# Patient Record
Sex: MALE | Race: WHITE | NOT HISPANIC OR LATINO | Employment: FULL TIME | URBAN - METROPOLITAN AREA
[De-identification: names, ages, dates, MRNs, and addresses within clinical notes are randomized per-mention and may not be internally consistent; named-entity substitution may affect disease eponyms.]

---

## 2018-02-21 ENCOUNTER — CONVERSION ENCOUNTER (OUTPATIENT)
Dept: FAMILY MEDICINE CLINIC | Facility: CLINIC | Age: 33
End: 2018-02-21

## 2018-02-21 ENCOUNTER — OFFICE VISIT CONVERTED (OUTPATIENT)
Dept: FAMILY MEDICINE CLINIC | Facility: CLINIC | Age: 33
End: 2018-02-21
Attending: NURSE PRACTITIONER

## 2019-11-19 ENCOUNTER — HOSPITAL ENCOUNTER (OUTPATIENT)
Dept: URGENT CARE | Facility: CLINIC | Age: 34
Discharge: HOME | End: 2019-11-19
Attending: FAMILY MEDICINE

## 2019-12-26 ENCOUNTER — HOSPITAL ENCOUNTER (OUTPATIENT)
Dept: URGENT CARE | Facility: CLINIC | Age: 34
Discharge: HOME | End: 2019-12-26

## 2020-01-07 ENCOUNTER — OFFICE VISIT CONVERTED (OUTPATIENT)
Dept: ORTHOPEDIC SURGERY | Facility: CLINIC | Age: 35
End: 2020-01-07
Attending: PHYSICIAN ASSISTANT

## 2020-01-23 ENCOUNTER — OFFICE VISIT CONVERTED (OUTPATIENT)
Dept: ORTHOPEDIC SURGERY | Facility: CLINIC | Age: 35
End: 2020-01-23
Attending: PHYSICIAN ASSISTANT

## 2020-01-23 ENCOUNTER — CONVERSION ENCOUNTER (OUTPATIENT)
Dept: ORTHOPEDIC SURGERY | Facility: CLINIC | Age: 35
End: 2020-01-23

## 2020-10-05 ENCOUNTER — OFFICE VISIT CONVERTED (OUTPATIENT)
Dept: CARDIOLOGY | Facility: CLINIC | Age: 35
End: 2020-10-05
Attending: INTERNAL MEDICINE

## 2021-04-14 ENCOUNTER — HOSPITAL ENCOUNTER (OUTPATIENT)
Dept: URGENT CARE | Facility: CLINIC | Age: 36
Discharge: HOME | End: 2021-04-14
Attending: EMERGENCY MEDICINE

## 2021-05-13 NOTE — PROGRESS NOTES
Progress Note      Patient Name: Deo Pacheco   Patient ID: [id number]   Sex: Male   YOB: 1985    Primary Care Provider: Tara OCONNOR    Visit Date: October 5, 2020    Provider: Quique Bullock MD   Location: [facility]   Location Address: [address] [place] [address]  [id number]          History Of Present Illness  REFERRING CARE PROVIDER: Feliciano Astudillo MD   I saw Deo Pacheco back in the office today for a follow-up evaluation and management of coronary artery disease with previous LAD PCI, recent hospitalization for angina, hypertension and smoking. Mr. Pacheco is doing relatively well. He denies chest pain or excessive shortness of breath. He is compliant with his medications. He was hospitalized for anginal chest pain in August. I saw him in consultation at that time. He underwent cardiac catheterization, which showed a patent LAD stent, borderline RCA stenosis, which was negative by FFR assessment. He had diffuse disease in two branch arteries off of his circumflex, which were too small for PCI, and medical therapy was recommended. He is trying to cut down on his smoking using nicotine patches. He does not have a quit date yet. He is otherwise compliant with his medications.   PAST MEDICAL HISTORY: Coronary artery disease; obesity; smoking. PAST SURGICAL HISTORY: Elbow surgery   PSYCHOSOCIAL HISTORY: He rarely drinks alcohol. He smokes 1/2 pack of cigarettes per day.   CURRENT MEDICATIONS: include Lipitor 40 mg daily; Losartan 25 mg daily; Metoprolol 50 mg daily; Clonidine 0.1 mg b.i.d.; ASA 81 mg daily. The dosage and frequency of the medications were reviewed with the patient.       Review of Systems  · Cardiovascular  o Denies  o : palpitations (fast, fluttering, or skipping beats), swelling (feet, ankles, hands), shortness of breath while walking or lying flat, chest pain or angina pectoris   · Respiratory  o Denies  o :  "chronic or frequent cough, asthma or wheezing      Vitals  Date Time BP Position Site L\R Cuff Size HR RR TEMP (F) WT  HT  BMI kg/m2 BSA m2 O2 Sat FR L/min FiO2 HC       10/05/2020 12:41 /90 Sitting    78 - R   242lbs 16oz 5'  7\" 38.06 2.28             Physical Examination  · Respiratory  o Auscultation of Lungs  o : Clear to auscultation bilaterally. No crackles or rhonchi.  · Cardiovascular  o Heart  o : S1, S2 is normally heard. No S3. No murmur, rubs, or gallops.  · Gastrointestinal  o Abdominal Examination  o : Soft, nontender, nondistended. No free fluid. Bowel sounds heard in all four quadrants.  · Extremities  o Extremities  o : Warm and well perfused. No pitting pedal edema. Distal pulses present.     I reviewed his hospital records and cardiac catheterization with him today.           Assessment     1.  Coronary artery disease - Recent cardiac catheterization showed borderline FFR negative RCA stenosis with       patent LAD stent and small-vessel-branch disease of the circumflex.  The patient is clinically stable.  2.  Hypertension:  Mildly uncontrolled.  3.  Smoking.       Plan     1.  Start low-dose Brilinta 60 mg b.i.d. along with his low-dose aspirin.  2.  I counseled him again on smoking cessation.  He is going to try to redouble his efforts to quit but has not yet       picked a quit date.  3.  Continue anti-hypertensive regimen and statin therapy.  4.  Cut down dietary sodium.  5.  Attempt slow weight loss.  6.  Follow up in 6 months in the office, unless problems arise in the meantime.  He is agreeable with this plan.    Please let me know if you have any questions regarding his case.    Sincerely,        MARITZA banegas/logan           This note was transcribed by Latosha Robin.  dmd/cbd  The above service was transcribed by Latosha Robin, and I attest to the accuracy of the note.  CBD.             Electronically Signed by: Latosha Robin-, -Author on October 7, 2020 09:03:37 " AM  Electronically Co-signed by: Quique Bullock MD -Reviewer on October 7, 2020 01:35:19 PM

## 2021-05-14 VITALS
SYSTOLIC BLOOD PRESSURE: 141 MMHG | HEIGHT: 67 IN | DIASTOLIC BLOOD PRESSURE: 90 MMHG | HEART RATE: 78 BPM | WEIGHT: 243 LBS | BODY MASS INDEX: 38.14 KG/M2

## 2021-05-15 VITALS — OXYGEN SATURATION: 99 % | BODY MASS INDEX: 39.24 KG/M2 | WEIGHT: 250 LBS | HEART RATE: 68 BPM | HEIGHT: 67 IN

## 2021-05-16 VITALS
HEIGHT: 67 IN | OXYGEN SATURATION: 97 % | BODY MASS INDEX: 39.55 KG/M2 | RESPIRATION RATE: 16 BRPM | TEMPERATURE: 98.4 F | HEART RATE: 89 BPM | WEIGHT: 252 LBS | DIASTOLIC BLOOD PRESSURE: 78 MMHG | SYSTOLIC BLOOD PRESSURE: 126 MMHG

## 2022-04-19 ENCOUNTER — APPOINTMENT (OUTPATIENT)
Dept: GENERAL RADIOLOGY | Facility: HOSPITAL | Age: 37
End: 2022-04-19

## 2022-04-19 ENCOUNTER — HOSPITAL ENCOUNTER (OUTPATIENT)
Facility: HOSPITAL | Age: 37
Setting detail: OBSERVATION
Discharge: HOME | End: 2022-04-20
Attending: EMERGENCY MEDICINE | Admitting: INTERNAL MEDICINE

## 2022-04-19 ENCOUNTER — APPOINTMENT (OUTPATIENT)
Dept: CARDIOLOGY | Facility: HOSPITAL | Age: 37
End: 2022-04-19

## 2022-04-19 DIAGNOSIS — I24.9 ACUTE CORONARY SYNDROME: Primary | ICD-10-CM

## 2022-04-19 PROBLEM — R07.9 CHEST PAIN: Status: ACTIVE | Noted: 2022-04-19

## 2022-04-19 LAB
ALBUMIN SERPL-MCNC: 4.5 G/DL (ref 3.5–5.2)
ALBUMIN/GLOB SERPL: 1.7 G/DL
ALP SERPL-CCNC: 130 U/L (ref 39–117)
ALT SERPL W P-5'-P-CCNC: 31 U/L (ref 1–41)
ANION GAP SERPL CALCULATED.3IONS-SCNC: 11.9 MMOL/L (ref 5–15)
AST SERPL-CCNC: 16 U/L (ref 1–40)
BASOPHILS # BLD AUTO: 0.06 10*3/MM3 (ref 0–0.2)
BASOPHILS NFR BLD AUTO: 0.6 % (ref 0–1.5)
BILIRUB SERPL-MCNC: 0.3 MG/DL (ref 0–1.2)
BUN SERPL-MCNC: 10 MG/DL (ref 6–20)
BUN/CREAT SERPL: 11.4 (ref 7–25)
CALCIUM SPEC-SCNC: 8.8 MG/DL (ref 8.6–10.5)
CHLORIDE SERPL-SCNC: 104 MMOL/L (ref 98–107)
CK MB SERPL-CCNC: 1.37 NG/ML
CK SERPL-CCNC: 108 U/L (ref 20–200)
CO2 SERPL-SCNC: 24.1 MMOL/L (ref 22–29)
CREAT SERPL-MCNC: 0.88 MG/DL (ref 0.76–1.27)
DEPRECATED RDW RBC AUTO: 41.1 FL (ref 37–54)
EGFRCR SERPLBLD CKD-EPI 2021: 115.7 ML/MIN/1.73
EOSINOPHIL # BLD AUTO: 0.19 10*3/MM3 (ref 0–0.4)
EOSINOPHIL NFR BLD AUTO: 2 % (ref 0.3–6.2)
ERYTHROCYTE [DISTWIDTH] IN BLOOD BY AUTOMATED COUNT: 13.2 % (ref 12.3–15.4)
GLOBULIN UR ELPH-MCNC: 2.6 GM/DL
GLUCOSE SERPL-MCNC: 130 MG/DL (ref 65–99)
HCT VFR BLD AUTO: 43.2 % (ref 37.5–51)
HGB BLD-MCNC: 15.1 G/DL (ref 13–17.7)
HOLD SPECIMEN: NORMAL
IMM GRANULOCYTES # BLD AUTO: 0.03 10*3/MM3 (ref 0–0.05)
IMM GRANULOCYTES NFR BLD AUTO: 0.3 % (ref 0–0.5)
LIPASE SERPL-CCNC: 35 U/L (ref 13–60)
LYMPHOCYTES # BLD AUTO: 2.26 10*3/MM3 (ref 0.7–3.1)
LYMPHOCYTES NFR BLD AUTO: 23.9 % (ref 19.6–45.3)
MAGNESIUM SERPL-MCNC: 2.1 MG/DL (ref 1.6–2.6)
MCH RBC QN AUTO: 29.8 PG (ref 26.6–33)
MCHC RBC AUTO-ENTMCNC: 35 G/DL (ref 31.5–35.7)
MCV RBC AUTO: 85.2 FL (ref 79–97)
MONOCYTES # BLD AUTO: 0.63 10*3/MM3 (ref 0.1–0.9)
MONOCYTES NFR BLD AUTO: 6.7 % (ref 5–12)
NEUTROPHILS NFR BLD AUTO: 6.27 10*3/MM3 (ref 1.7–7)
NEUTROPHILS NFR BLD AUTO: 66.5 % (ref 42.7–76)
NRBC BLD AUTO-RTO: 0 /100 WBC (ref 0–0.2)
NT-PROBNP SERPL-MCNC: 28.7 PG/ML (ref 0–450)
PLATELET # BLD AUTO: 275 10*3/MM3 (ref 140–450)
PMV BLD AUTO: 9.5 FL (ref 6–12)
POTASSIUM SERPL-SCNC: 3.4 MMOL/L (ref 3.5–5.2)
PROT SERPL-MCNC: 7.1 G/DL (ref 6–8.5)
QT INTERVAL: 403 MS
RBC # BLD AUTO: 5.07 10*6/MM3 (ref 4.14–5.8)
SODIUM SERPL-SCNC: 140 MMOL/L (ref 136–145)
TROPONIN I SERPL-MCNC: 0.01 NG/ML (ref 0–0.6)
TROPONIN I SERPL-MCNC: 0.03 NG/ML (ref 0–0.6)
TROPONIN T SERPL-MCNC: <0.01 NG/ML (ref 0–0.03)
WBC NRBC COR # BLD: 9.44 10*3/MM3 (ref 3.4–10.8)
WHOLE BLOOD HOLD SPECIMEN: NORMAL
WHOLE BLOOD HOLD SPECIMEN: NORMAL

## 2022-04-19 PROCEDURE — 93306 TTE W/DOPPLER COMPLETE: CPT | Performed by: INTERNAL MEDICINE

## 2022-04-19 PROCEDURE — 84484 ASSAY OF TROPONIN QUANT: CPT | Performed by: INTERNAL MEDICINE

## 2022-04-19 PROCEDURE — 83880 ASSAY OF NATRIURETIC PEPTIDE: CPT | Performed by: EMERGENCY MEDICINE

## 2022-04-19 PROCEDURE — 93005 ELECTROCARDIOGRAM TRACING: CPT | Performed by: EMERGENCY MEDICINE

## 2022-04-19 PROCEDURE — 83690 ASSAY OF LIPASE: CPT | Performed by: EMERGENCY MEDICINE

## 2022-04-19 PROCEDURE — 83735 ASSAY OF MAGNESIUM: CPT | Performed by: EMERGENCY MEDICINE

## 2022-04-19 PROCEDURE — 84484 ASSAY OF TROPONIN QUANT: CPT

## 2022-04-19 PROCEDURE — 96372 THER/PROPH/DIAG INJ SC/IM: CPT

## 2022-04-19 PROCEDURE — 82550 ASSAY OF CK (CPK): CPT | Performed by: EMERGENCY MEDICINE

## 2022-04-19 PROCEDURE — G0378 HOSPITAL OBSERVATION PER HR: HCPCS

## 2022-04-19 PROCEDURE — 80053 COMPREHEN METABOLIC PANEL: CPT | Performed by: EMERGENCY MEDICINE

## 2022-04-19 PROCEDURE — 85025 COMPLETE CBC W/AUTO DIFF WBC: CPT | Performed by: EMERGENCY MEDICINE

## 2022-04-19 PROCEDURE — 82553 CREATINE MB FRACTION: CPT | Performed by: EMERGENCY MEDICINE

## 2022-04-19 PROCEDURE — 94799 UNLISTED PULMONARY SVC/PX: CPT

## 2022-04-19 PROCEDURE — 93005 ELECTROCARDIOGRAM TRACING: CPT

## 2022-04-19 PROCEDURE — 36415 COLL VENOUS BLD VENIPUNCTURE: CPT | Performed by: EMERGENCY MEDICINE

## 2022-04-19 PROCEDURE — 93306 TTE W/DOPPLER COMPLETE: CPT

## 2022-04-19 PROCEDURE — 25010000002 ENOXAPARIN PER 10 MG: Performed by: INTERNAL MEDICINE

## 2022-04-19 PROCEDURE — 93010 ELECTROCARDIOGRAM REPORT: CPT | Performed by: INTERNAL MEDICINE

## 2022-04-19 PROCEDURE — 99284 EMERGENCY DEPT VISIT MOD MDM: CPT

## 2022-04-19 PROCEDURE — 71045 X-RAY EXAM CHEST 1 VIEW: CPT

## 2022-04-19 PROCEDURE — 99220 PR INITIAL OBSERVATION CARE/DAY 70 MINUTES: CPT | Performed by: INTERNAL MEDICINE

## 2022-04-19 RX ORDER — MORPHINE SULFATE 2 MG/ML
1 INJECTION, SOLUTION INTRAMUSCULAR; INTRAVENOUS EVERY 4 HOURS PRN
Status: DISCONTINUED | OUTPATIENT
Start: 2022-04-19 | End: 2022-04-20 | Stop reason: HOSPADM

## 2022-04-19 RX ORDER — NITROGLYCERIN 0.4 MG/1
0.4 TABLET SUBLINGUAL
Status: DISCONTINUED | OUTPATIENT
Start: 2022-04-19 | End: 2022-04-20 | Stop reason: HOSPADM

## 2022-04-19 RX ORDER — SODIUM CHLORIDE 0.9 % (FLUSH) 0.9 %
10 SYRINGE (ML) INJECTION AS NEEDED
Status: DISCONTINUED | OUTPATIENT
Start: 2022-04-19 | End: 2022-04-20 | Stop reason: HOSPADM

## 2022-04-19 RX ORDER — ATORVASTATIN CALCIUM 40 MG/1
40 TABLET, FILM COATED ORAL NIGHTLY
Status: DISCONTINUED | OUTPATIENT
Start: 2022-04-19 | End: 2022-04-20 | Stop reason: HOSPADM

## 2022-04-19 RX ORDER — ACETAMINOPHEN 325 MG/1
650 TABLET ORAL EVERY 4 HOURS PRN
Status: DISCONTINUED | OUTPATIENT
Start: 2022-04-19 | End: 2022-04-20 | Stop reason: HOSPADM

## 2022-04-19 RX ORDER — LOSARTAN POTASSIUM 100 MG/1
100 TABLET ORAL DAILY
COMMUNITY

## 2022-04-19 RX ORDER — HYDROCODONE BITARTRATE AND ACETAMINOPHEN 5; 325 MG/1; MG/1
1 TABLET ORAL EVERY 4 HOURS PRN
Status: DISCONTINUED | OUTPATIENT
Start: 2022-04-19 | End: 2022-04-20 | Stop reason: HOSPADM

## 2022-04-19 RX ORDER — ALUMINA, MAGNESIA, AND SIMETHICONE 2400; 2400; 240 MG/30ML; MG/30ML; MG/30ML
15 SUSPENSION ORAL EVERY 6 HOURS PRN
Status: DISCONTINUED | OUTPATIENT
Start: 2022-04-19 | End: 2022-04-20 | Stop reason: HOSPADM

## 2022-04-19 RX ORDER — ASPIRIN 81 MG/1
324 TABLET, CHEWABLE ORAL ONCE
Status: DISCONTINUED | OUTPATIENT
Start: 2022-04-19 | End: 2022-04-20 | Stop reason: HOSPADM

## 2022-04-19 RX ORDER — SODIUM CHLORIDE 0.9 % (FLUSH) 0.9 %
10 SYRINGE (ML) INJECTION EVERY 12 HOURS SCHEDULED
Status: DISCONTINUED | OUTPATIENT
Start: 2022-04-19 | End: 2022-04-20 | Stop reason: HOSPADM

## 2022-04-19 RX ORDER — NALOXONE HCL 0.4 MG/ML
0.4 VIAL (ML) INJECTION
Status: DISCONTINUED | OUTPATIENT
Start: 2022-04-19 | End: 2022-04-20 | Stop reason: HOSPADM

## 2022-04-19 RX ORDER — AMLODIPINE BESYLATE 5 MG/1
5 TABLET ORAL DAILY
COMMUNITY

## 2022-04-19 RX ORDER — ONDANSETRON 2 MG/ML
4 INJECTION INTRAMUSCULAR; INTRAVENOUS EVERY 6 HOURS PRN
Status: DISCONTINUED | OUTPATIENT
Start: 2022-04-19 | End: 2022-04-20 | Stop reason: HOSPADM

## 2022-04-19 RX ORDER — LOSARTAN POTASSIUM 50 MG/1
100 TABLET ORAL
Status: DISCONTINUED | OUTPATIENT
Start: 2022-04-20 | End: 2022-04-20 | Stop reason: HOSPADM

## 2022-04-19 RX ORDER — AMLODIPINE BESYLATE 5 MG/1
5 TABLET ORAL DAILY
Status: DISCONTINUED | OUTPATIENT
Start: 2022-04-20 | End: 2022-04-20 | Stop reason: HOSPADM

## 2022-04-19 RX ORDER — ATORVASTATIN CALCIUM 40 MG/1
40 TABLET, FILM COATED ORAL NIGHTLY
COMMUNITY

## 2022-04-19 RX ADMIN — Medication 10 ML: at 20:36

## 2022-04-19 RX ADMIN — NITROGLYCERIN 1 INCH: 20 OINTMENT TOPICAL at 15:16

## 2022-04-19 RX ADMIN — ENOXAPARIN SODIUM 40 MG: 100 INJECTION SUBCUTANEOUS at 18:55

## 2022-04-19 RX ADMIN — ATORVASTATIN CALCIUM 40 MG: 40 TABLET, FILM COATED ORAL at 20:36

## 2022-04-19 NOTE — H&P
[facility] HISTORY AND PHYSICAL  Date: 2022   Patient Name: Deo Pacheco  : 1985  MRN: [id number]  Primary Care Physician:  Blanco Angelo MD  Date of admission: 2022    Subjective   Subjective     Chief Complaint: chest pain     HPI:    Deo Pacheco is a 36 y.o. male with history of coronary artery disease with stent placement in 2019 who presented to the ER with chest pain.  Patient states his chest pain started about 1030 this morning he states it was a combination of intermittent sharp pain as well as pressure pain.  It was on the left side of his chest.  He said he felt some numbness tingling down his arm and in his last 2 fingers.  He states that he took a nitroglycerin and his chest pain eventually resolved.  Patient was chest pain-free when I saw him in the ER however he stated he felt like his chest pain was coming back and it was more of a pressure pain.  Patient denies any nausea or vomiting but patient states he has not eaten most of the day.  Patient's work-up in the ER was unremarkable.  His vital signs were stable.  His EKG was fine.  His troponins were negative as well as his laboratory data however given his past history of cardiac disease and history of stent placement Hospital service was asked for observation with cardiology consult      Personal History     Past Medical History:  Coronary artery disease with history of stent placement in 2019  Tobacco abuse chronic and ongoing  Hypertension  Super morbid obesity with a BMI of 41  History of seizure disorder  Hyperlipidemia        Past Surgical History:  Coronary artery stent placement to the diagonal branch in 2019    Family History:   Positive for hypertension  Social History:   Patient smokes about half pack per day patient denies any alcohol or drug use  Patient's hockey patient is a   Home Medications:  amLODIPine, atorvastatin, and losartan    Allergies:  No Known  Allergies    Review of Systems  History obtained from the patient  General ROS:  Negative for - chills, fever, malaise, or night sweats  Psychological ROS: negative for - anxiety, depression, hallucinations, or mood swings  Ophthalmic ROS: negative for - blurry vision, double vision, or itchy eyes  ENT ROS: negative for - headaches, nasal congestion, sneezing, or sore throat  Hematological and Lymphatic ROS: negative for - bleeding problems, bruising, or jaundice  Endocrine ROS: negative for - malaise/lethargy, polydipsia/polyuria, or temperature intolerance  Respiratory ROS: negative for - cough, orthopnea, shortness of breath, sputum changes, tachypnea, or wheezing  Cardiovascular ROS: Positive for intermittent chest pain/pressure.  No palpitations.    Gastrointestinal ROS: negative for - abdominal pain, constipation, diarrhea, heartburn, hematemesis, or nausea/vomiting  Genito-Urinary ROS: negative for - change in urinary stream, hematuria, incontinence, or urinary frequency/urgency  Musculoskeletal ROS: negative for - joint stiffness, joint swelling, muscle pain, or muscular weakness  Neurological ROS: negative for - confusion, dizziness, gait disturbance, headaches, impaired coordination/balance, memory loss, numbness/tingling, seizures, or visual changes  Dermatological ROS: negative for dry skin and rash       Objective   Objective     Vitals:   Temp:  [100.5 °F (38.1 °C)] 100.5 °F (38.1 °C)  Heart Rate:  [68-79] 68  Resp:  [18] 18  BP: (142-166)/() 142/87    Physical Exam    Constitutional: Awake, alert, no acute distress LYING IN HOSPITAL BED IN ER WITH WIFE AT BEDSIDE    Eyes: Pupils equal, sclerae anicteric, no conjunctival injection   HENT: NCAT, mucous membranes moist   Neck: Supple, no thyromegaly, no lymphadenopathy, trachea midline   Respiratory: Clear to auscultation bilaterally, nonlabored respirations no wheezing, rales or rhonchi noted    Cardiovascular: RRR, no murmurs,   Gastrointestinal:  Positive bowel sounds, soft, nontender, nondistended   Musculoskeletal: No bilateral ankle edema, no clubbing or cyanosis to extremities   Psychiatric: Appropriate affect, cooperative   Neurologic: Oriented x 3, strength symmetric in all extremities, Cranial Nerves grossly intact to confrontation, speech clear   Skin: No rashes     Result Review    Result Review:  I have personally reviewed the results from the time of this admission to 4/19/2022 15:28 EDT and agree with these findings:  [x]  Laboratory   CBC    CBC 4/19/22   WBC 9.44   RBC 5.07   Hemoglobin 15.1   Hematocrit 43.2   MCV 85.2   MCH 29.8   MCHC 35.0   RDW 13.2   Platelets 275           BMP    BMP 4/19/22   BUN 10   Creatinine 0.88   Sodium 140   Potassium 3.4 (A)   Chloride 104   CO2 24.1   Calcium 8.8   (A) Abnormal value              [x]  Microbiology   No results found for: ACANTHNAEG, AFBCX, BPERTUSSISCX, BLOODCX  No results found for: BCIDPCR, CXREFLEX, CSFCX, CULTURETIS  No results found for: CULTURES, HSVCX, URCX  No results found for: EYECULTURE, GCCX, HSVCULTURE, LABHSV  No results found for: LEGIONELLA, MRSACX, MUMPSCX, MYCOPLASCX  No results found for: NOCARDIACX, STOOLCX  No results found for: THROATCX, UNSTIMCULT, URINECX, CULTURE, VZVCULTUR  No results found for: VIRALCULTU, WOUNDCX    [x]  Radiology  XR Chest 1 View    Result Date: 4/19/2022  PROCEDURE: XR CHEST 1 VW  COMPARISON: [facility], CT, CHEST W/ CONTRAST, 10/04/2019, 15:41.  [facility], CR, CHEST AP/PA 1 VIEW, 10/04/2019, 14:24.  [facility], CR, CHEST AP/PA 1 VIEW, 8/20/2020, 6:58.  INDICATIONS: MID CHEST PAIN TODAY. PREVIOUS HEART ATTACK AND HEART STENT 2019.  FINDINGS:   The lungs are well-expanded. The heart and pulmonary vasculature are within normal limits. No pleural effusions are identified. There are no active appearing infiltrates.  IMPRESSION: No active disease.  MADHURI LEDEZMA MD       Electronically Signed and Approved By:  MADHURI LEDEZMA MD on 4/19/2022 at 12:43               [x]  EKG/Telemetry    NSR   [x]  Cardiology/Vascular   []  Pathology  []  Old records  []  Other:      Assessment/Plan   Assessment / Plan     Assessment/Plan:   • Chest pain, rule out acute coronary syndrome  • Hypertension  • Hyperlipidemia  • Chronic ongoing tobacco abuse  • History of cardiac stent placement    PLAN   admit patient for observation  Continue with serial troponin  Continue with Lipitor at night  Continue with daily aspirin   Continue patient on losartan 100 mg daily  Continue patient on Norvasc 5 mg daily  Patient was previously supposed to be on metoprolol however is no longer taking.  Will defer to cardiology  Obtain echocardiogram since it has been quite sometime  Dr. Bullock his cardiologist will be consulted and can see patient in the morning  Patient's last cardiac catheterization was in August 2020 which showed borderline stenosis involving the right coronary artery 60 to 70%.  Stable diffuse disease involving obtuse marginal, severe in nature but not amendable to revascularization.  Severe disease involving a small diagonal branch not amendable to revascularization.  Moderate disease involving the circumflex artery in the mid and distal portion unchanged from the previous heart cath.  At that time it was recommended to continue medical management      DVT prophylaxis:  Lovenox   No DVT prophylaxis order currently exists.    CODE STATUS:    Code Status (Patient has no pulse and is not breathing): CPR (Attempt to Resuscitate)  Medical Interventions (Patient has pulse or is breathing): Full Support      Admission Status:  I believe this patient meets  Observation status.    Electronically signed by Pramod Garcia DO, 04/19/22, 3:28 PM EDT.

## 2022-04-19 NOTE — ED PROVIDER NOTES
Time: 2:23 PM EDT  Arrived by: Private vehicle  Chief Complaint: Chest pain  History provided by: Patient  History is limited by: N/A     History of Present Illness:  Patient is a 36 y.o.  male that presents to the emergency department with chest pain.  Patient reports chest pain began this morning around 10:30 AM while he was at work.  Patient states there was no strenuous activity in the time.  Patient reports the pain is a heaviness and burning kind of sensation.  Patient states it was located in the midsternal region.  He states the pain feels similar to what he experienced several years ago he had a cardiac cath and stent placed.  Patient reports he left work and on his way home it went away and he almost returned back to work however he already had a chance to return to work pain came back.  He went home to get his wife including nitro as well as 4 aspirin and tenderness later the pain was gone.  Because of this history presents to the ER for evaluation.  Patient also states that when he had his chest pain it took his breath away from him.    HPI    Patient Care Team  Primary Care Provider: Blanco nAgelo MD    Past Medical History:     No Known Allergies  No past medical history on file.  No past surgical history on file.  No family history on file.    Home Medications:  Prior to Admission medications    Not on File        Social History:        Review of Systems:  Review of Systems   Constitutional: Negative for chills and fever.   HENT: Negative for congestion, ear pain and sore throat.    Eyes: Negative for pain.   Respiratory: Negative for cough, chest tightness and shortness of breath.    Cardiovascular: Positive for chest pain.   Gastrointestinal: Negative for abdominal pain, diarrhea, nausea and vomiting.   Genitourinary: Negative for flank pain and hematuria.   Musculoskeletal: Negative for joint swelling.   Skin: Negative for pallor.   Neurological: Negative for seizures and headaches.   All other  "systems reviewed and are negative.         Physical Exam:  BP (!) 166/101   Pulse 79   Temp 100.5 °F (38.1 °C) (Oral)   Resp 18   Ht 170.2 cm (67\")   Wt 119 kg (262 lb 9.1 oz)   SpO2 98%   BMI 41.12 kg/m²     Physical Exam Vital signs were reviewed under triage note.  General appearance - Patient appears well-developed and well-nourished.  Patient is in no acute distress.  Head - Normocephalic, atraumatic.  Pupils - Equal, round, reactive to light.  Extraocular muscles are intact.  Conjunctive is clear.  Nasal - Normal inspection.  No evidence of trauma or epistaxis.  Tympanic membranes - Gray, intact without erythema or retractions.  Oral mucosa - Pink and moist without lesions or erythema.  Uvula is midline.  Chest wall - Atraumatic.  Chest wall is nontender.  There is no vesicular rashes noted.  Neck - Supple.  Trachea was midline.  There is no palpable lymphadenopathy or thyromegaly.  There are no meningeal signs  Lungs - Clear to auscultation and percussion bilaterally.  Heart - Regular rate and rhythm without any murmurs, clicks, or gallops.  Abdomen - Soft.  Bowel sounds are present.  There is no palpable tenderness.  There is no rebound, guarding, or rigidity.  There are no palpable masses.  There are no pulsatile masses.  Back - Spine is straight and midline.  There is no CVA tenderness.  Extremities - Intact x4 with full range of motion.  There is no palpable edema.  Pulses are intact x4 and equal.  Neurologic - Patient is awake, alert, and oriented x3.  Cranial nerves II through XII are grossly intact.  Motor and sensory functions grossly intact.  Cerebellar function was normal.  Integument - There are no rashes.  There are no petechia or purpura lesions noted.  There are no vesicular lesions noted.            Medications in the Emergency Department:  Medications   sodium chloride 0.9 % flush 10 mL (has no administration in time range)   aspirin chewable tablet 324 mg (has no administration in time " range)        Labs  Lab Results (last 24 hours)     Procedure Component Value Units Date/Time    POC Troponin I with Hold Tube [[id number]] Collected: 04/19/22 1228    Specimen: Blood Updated: 04/19/22 1234    Narrative:      The following orders were created for panel order POC Troponin I with Hold Tube.  Procedure                               Abnormality         Status                     ---------                               -----------         ------                     POC Troponin I[[id number]]                                                              HOLD Troponin-I Tube[[id number]]                             Final result                 Please view results for these tests on the individual orders.    CBC & Differential [[id number]]  (Normal) Collected: 04/19/22 1228    Specimen: Blood from Arm, Right Updated: 04/19/22 1237    Narrative:      The following orders were created for panel order CBC & Differential.  Procedure                               Abnormality         Status                     ---------                               -----------         ------                     CBC Auto Differential[[id number]]        Normal              Final result                 Please view results for these tests on the individual orders.    Comprehensive Metabolic Panel [[id number]]  (Abnormal) Collected: 04/19/22 1228    Specimen: Blood from Arm, Right Updated: 04/19/22 1304     Glucose 130 mg/dL      BUN 10 mg/dL      Creatinine 0.88 mg/dL      Sodium 140 mmol/L      Potassium 3.4 mmol/L      Chloride 104 mmol/L      CO2 24.1 mmol/L      Calcium 8.8 mg/dL      Total Protein 7.1 g/dL      Albumin 4.50 g/dL      ALT (SGPT) 31 U/L      AST (SGOT) 16 U/L      Alkaline Phosphatase 130 U/L      Total Bilirubin 0.3 mg/dL      Globulin 2.6 gm/dL      A/G Ratio 1.7 g/dL      BUN/Creatinine Ratio 11.4     Anion Gap 11.9 mmol/L      eGFR 115.7 mL/min/1.73      Comment: National Kidney Foundation and American Society of  Nephrology (ASN) Task Force recommended calculation based on the Chronic Kidney Disease Epidemiology Collaboration (CKD-EPI) equation refit without adjustment for race.       Narrative:      GFR Normal >60  Chronic Kidney Disease <60  Kidney Failure <15      Lipase [[id number]]  (Normal) Collected: 04/19/22 1228    Specimen: Blood from Arm, Right Updated: 04/19/22 1304     Lipase 35 U/L     BNP [[id number]]  (Normal) Collected: 04/19/22 1228    Specimen: Blood from Arm, Right Updated: 04/19/22 1257     proBNP 28.7 pg/mL     Narrative:      Among patients with dyspnea, NT-proBNP is highly sensitive for the detection of acute congestive heart failure. In addition NT-proBNP of <300 pg/ml effectively rules out acute congestive heart failure with 99% negative predictive value.    Results may be falsely decreased if patient taking Biotin.      Magnesium [[id number]]  (Normal) Collected: 04/19/22 1228    Specimen: Blood from Arm, Right Updated: 04/19/22 1304     Magnesium 2.1 mg/dL     CK Total & CKMB [[id number]]  (Normal) Collected: 04/19/22 1228    Specimen: Blood from Arm, Right Updated: 04/19/22 1304     CKMB 1.37 ng/mL      Creatine Kinase 108 U/L     Narrative:      CKMB results may be falsely decreased if patient taking Biotin.    CBC Auto Differential [[id number]]  (Normal) Collected: 04/19/22 1228    Specimen: Blood from Arm, Right Updated: 04/19/22 1237     WBC 9.44 10*3/mm3      RBC 5.07 10*6/mm3      Hemoglobin 15.1 g/dL      Hematocrit 43.2 %      MCV 85.2 fL      MCH 29.8 pg      MCHC 35.0 g/dL      RDW 13.2 %      RDW-SD 41.1 fl      MPV 9.5 fL      Platelets 275 10*3/mm3      Neutrophil % 66.5 %      Lymphocyte % 23.9 %      Monocyte % 6.7 %      Eosinophil % 2.0 %      Basophil % 0.6 %      Immature Grans % 0.3 %      Neutrophils, Absolute 6.27 10*3/mm3      Lymphocytes, Absolute 2.26 10*3/mm3      Monocytes, Absolute 0.63 10*3/mm3      Eosinophils, Absolute 0.19 10*3/mm3      Basophils, Absolute 0.06 10*3/mm3       Immature Grans, Absolute 0.03 10*3/mm3      nRBC 0.0 /100 WBC     POC Troponin I [[id number]]  (Normal) Collected: 04/19/22 1231    Specimen: Blood Updated: 04/19/22 1244     Troponin I 0.01 ng/mL      Comment: Serial Number: [id number]:  [id number]              Imaging:  XR Chest 1 View    Result Date: 4/19/2022  PROCEDURE: XR CHEST 1 VW  COMPARISON: [facility], CT, CHEST W/ CONTRAST, 10/04/2019, 15:41.  [facility], CR, CHEST AP/PA 1 VIEW, 10/04/2019, 14:24.  [facility], CR, CHEST AP/PA 1 VIEW, 8/20/2020, 6:58.  INDICATIONS: MID CHEST PAIN TODAY. PREVIOUS HEART ATTACK AND HEART STENT 2019.  FINDINGS:   The lungs are well-expanded. The heart and pulmonary vasculature are within normal limits. No pleural effusions are identified. There are no active appearing infiltrates.  IMPRESSION: No active disease.  MADHURI LEDEZMA MD       Electronically Signed and Approved By: MADHURI LEDEZMA MD on 4/19/2022 at 12:43                EKG:  EKG performed at 1220 was read by me to show a normal sinus rhythm with ventricular rate 75 beats minute.  The intervals are 33 ms.  P waves are normal.  QRS intervals normal.  Axis is a 22 degrees.  There were some inverted T waves in lead V5 and V6 as well as leads I and aVL.  QT corrected was 449 ms.  This EKG was compared with the prior dated 8/21/2020 and there is no significant EKG changes.    Procedures:  Procedures    Progress                      The patient was seen and evaluated in the ED by me.  The above history and physical examination was performed as documented.  Diagnostic data was obtained.  Results reviewed.  Patient is pain-free during his ED course.  Patient case was discussed with Dr. Bullock.  He agreed to the need for admission.  Hospital service has been consulted for primary admission.  Patient will be made n.p.o. after midnight.  Patient is agreeable to this plan as well.      Medical Decision Making:  JAMES     Final  diagnoses:   Acute coronary syndrome (HCC)        Disposition:  ED Disposition     ED Disposition   Decision to Admit    Condition   --    Comment   --              Samir Morales DO  04/20/22 1058

## 2022-04-19 NOTE — PAYOR COMM NOTE
"Deo Yan (36 y.o. Male)     OBSERVATION admission             Date of Birth   1985    Social Security Number       Address   [address] [place] [address] [address] [place] [address]    Home Phone   [phone]    MRN   [id number]       Orthodox   None    Marital Status   Single                            Admission Date   22    Admission Type   Emergency    Admitting Provider   Pramod Garcia DO    Attending Provider   Pramod Garcia DO    Department, Room/Bed   [facility] EMERGENCY ROOM, ILA/ILA       Discharge Date       Discharge Disposition       Discharge Destination                               Attending Provider: Pramod Garcia DO    Allergies: No Known Allergies    Isolation: None   Infection: None   Code Status: CPR   Advance Care Planning Activity    Ht: 170.2 cm (67\")   Wt: 119 kg (262 lb 9.1 oz)    Admission Cmt: None   Principal Problem: None                Active Insurance as of 2022     Primary Coverage     Payor Plan Insurance Group Employer/Plan Group    [employer] BLUE [employer]OSS [employer] BLUE CROSS BLUE SHIELD PPO [id number]     Payor Plan Address Payor Plan Phone Number Payor Plan Fax Number Effective Dates    [address] [phone]  2022 - None Entered    [place] [address]       Subscriber Name Subscriber Birth Date Member ID       DEO YAN 1985 [id number]                 Emergency Contacts      (Rel.) Home Phone Work Phone Mobile Phone    TORI VALERA (Significant Other) -- -- [phone]            [place]: NPI [id number] Tax ID [id number]     History & Physical      Pramod Garcia DO at 22 [address] [place] [address] HISTORY AND PHYSICAL  Date: 2022   Patient Name: Deo Yan  : 1985  MRN: [id number]  Primary Care Physician:  Blanco Angelo MD  Date of admission: 2022    Subjective   Subjective     Chief Complaint: chest pain     HPI:    Deo Yan is a 36 y.o. male with " history of coronary artery disease with stent placement in 2019 who presented to the ER with chest pain.  Patient states his chest pain started about 1030 this morning he states it was a combination of intermittent sharp pain as well as pressure pain.  It was on the left side of his chest.  He said he felt some numbness tingling down his arm and in his last 2 fingers.  He states that he took a nitroglycerin and his chest pain eventually resolved.  Patient was chest pain-free when I saw him in the ER however he stated he felt like his chest pain was coming back and it was more of a pressure pain.  Patient denies any nausea or vomiting but patient states he has not eaten most of the day.  Patient's work-up in the ER was unremarkable.  His vital signs were stable.  His EKG was fine.  His troponins were negative as well as his laboratory data however given his past history of cardiac disease and history of stent placement Hospital service was asked for observation with cardiology consult      Personal History     Past Medical History:  Coronary artery disease with history of stent placement in 2019  Tobacco abuse chronic and ongoing  Hypertension  Super morbid obesity with a BMI of 41  History of seizure disorder  Hyperlipidemia        Past Surgical History:  Coronary artery stent placement to the diagonal branch in 2019    Family History:   Positive for hypertension  Social History:   Patient smokes about half pack per day patient denies any alcohol or drug use  Patient's hockey patient is a   Home Medications:  amLODIPine, atorvastatin, and losartan    Allergies:  No Known Allergies    Review of Systems  History obtained from the patient  General ROS:  Negative for - chills, fever, malaise, or night sweats  Psychological ROS: negative for - anxiety, depression, hallucinations, or mood swings  Ophthalmic ROS: negative for - blurry vision, double vision, or itchy eyes  ENT ROS: negative for - headaches,  nasal congestion, sneezing, or sore throat  Hematological and Lymphatic ROS: negative for - bleeding problems, bruising, or jaundice  Endocrine ROS: negative for - malaise/lethargy, polydipsia/polyuria, or temperature intolerance  Respiratory ROS: negative for - cough, orthopnea, shortness of breath, sputum changes, tachypnea, or wheezing  Cardiovascular ROS: Positive for intermittent chest pain/pressure.  No palpitations.    Gastrointestinal ROS: negative for - abdominal pain, constipation, diarrhea, heartburn, hematemesis, or nausea/vomiting  Genito-Urinary ROS: negative for - change in urinary stream, hematuria, incontinence, or urinary frequency/urgency  Musculoskeletal ROS: negative for - joint stiffness, joint swelling, muscle pain, or muscular weakness  Neurological ROS: negative for - confusion, dizziness, gait disturbance, headaches, impaired coordination/balance, memory loss, numbness/tingling, seizures, or visual changes  Dermatological ROS: negative for dry skin and rash       Objective   Objective     Vitals:   Temp:  [100.5 °F (38.1 °C)] 100.5 °F (38.1 °C)  Heart Rate:  [68-79] 68  Resp:  [18] 18  BP: (142-166)/() 142/87    Physical Exam    Constitutional: Awake, alert, no acute distress LYING IN HOSPITAL BED IN ER WITH WIFE AT BEDSIDE    Eyes: Pupils equal, sclerae anicteric, no conjunctival injection   HENT: NCAT, mucous membranes moist   Neck: Supple, no thyromegaly, no lymphadenopathy, trachea midline   Respiratory: Clear to auscultation bilaterally, nonlabored respirations no wheezing, rales or rhonchi noted    Cardiovascular: RRR, no murmurs,   Gastrointestinal: Positive bowel sounds, soft, nontender, nondistended   Musculoskeletal: No bilateral ankle edema, no clubbing or cyanosis to extremities   Psychiatric: Appropriate affect, cooperative   Neurologic: Oriented x 3, strength symmetric in all extremities, Cranial Nerves grossly intact to confrontation, speech clear   Skin: No rashes      Result Review    Result Review:  I have personally reviewed the results from the time of this admission to 4/19/2022 15:28 EDT and agree with these findings:  [x]  Laboratory   CBC    CBC 4/19/22   WBC 9.44   RBC 5.07   Hemoglobin 15.1   Hematocrit 43.2   MCV 85.2   MCH 29.8   MCHC 35.0   RDW 13.2   Platelets 275           BMP    BMP 4/19/22   BUN 10   Creatinine 0.88   Sodium 140   Potassium 3.4 (A)   Chloride 104   CO2 24.1   Calcium 8.8   (A) Abnormal value              [x]  Microbiology   No results found for: ACANTHNAEG, AFBCX, BPERTUSSISCX, BLOODCX  No results found for: BCIDPCR, CXREFLEX, CSFCX, CULTURETIS  No results found for: CULTURES, HSVCX, URCX  No results found for: EYECULTURE, GCCX, HSVCULTURE, LABHSV  No results found for: LEGIONELLA, MRSACX, MUMPSCX, MYCOPLASCX  No results found for: NOCARDIACX, STOOLCX  No results found for: THROATCX, UNSTIMCULT, URINECX, CULTURE, VZVCULTUR  No results found for: VIRALCULTU, WOUNDCX    [x]  Radiology  XR Chest 1 View    Result Date: 4/19/2022  PROCEDURE: XR CHEST 1 VW  COMPARISON: [facility], CT, CHEST W/ CONTRAST, 10/04/2019, 15:41.  [facility], CR, CHEST AP/PA 1 VIEW, 10/04/2019, 14:24.  [facility], CR, CHEST AP/PA 1 VIEW, 8/20/2020, 6:58.  INDICATIONS: MID CHEST PAIN TODAY. PREVIOUS HEART ATTACK AND HEART STENT 2019.  FINDINGS:   The lungs are well-expanded. The heart and pulmonary vasculature are within normal limits. No pleural effusions are identified. There are no active appearing infiltrates.  IMPRESSION: No active disease.  MADHURI LEDEZMA MD       Electronically Signed and Approved By: MADHURI LEDEZMA MD on 4/19/2022 at 12:43               [x]  EKG/Telemetry    NSR   [x]  Cardiology/Vascular   []  Pathology  []  Old records  []  Other:      Assessment/Plan   Assessment / Plan     Assessment/Plan:   • Chest pain, rule out acute coronary syndrome  • Hypertension  • Hyperlipidemia  • Chronic ongoing tobacco  abuse  • History of cardiac stent placement    PLAN   admit patient for observation  Continue with serial troponin  Continue with Lipitor at night  Continue with daily aspirin   Continue patient on losartan 100 mg daily  Continue patient on Norvasc 5 mg daily  Patient was previously supposed to be on metoprolol however is no longer taking.  Will defer to cardiology  Obtain echocardiogram since it has been quite sometime  Dr. Bullock his cardiologist will be consulted and can see patient in the morning  Patient's last cardiac catheterization was in August 2020 which showed borderline stenosis involving the right coronary artery 60 to 70%.  Stable diffuse disease involving obtuse marginal, severe in nature but not amendable to revascularization.  Severe disease involving a small diagonal branch not amendable to revascularization.  Moderate disease involving the circumflex artery in the mid and distal portion unchanged from the previous heart cath.  At that time it was recommended to continue medical management      DVT prophylaxis:  Lovenox   No DVT prophylaxis order currently exists.    CODE STATUS:    Code Status (Patient has no pulse and is not breathing): CPR (Attempt to Resuscitate)  Medical Interventions (Patient has pulse or is breathing): Full Support      Admission Status:  I believe this patient meets  Observation status.    Electronically signed by Pramod Garcia DO, 04/19/22, 3:28 PM EDT.             Electronically signed by Pramod Garcia DO at 04/19/22 1625          Emergency Department Notes      Jackie Murrell PCT at 04/19/22 1509        REPEAT EKG (1421) NOT NEEDED PER DR. FAM HOYOS. MICHELLE     Electronically signed by Jackie Murrell PCT at 04/19/22 1510       Vital Signs (last day)     Date/Time Temp Temp src Pulse Resp BP Patient Position SpO2    04/19/22 16:11:25 98.1 (36.7) Oral -- -- -- -- --    04/19/22 1606 -- -- 70 -- -- -- 95    04/19/22 1445 -- -- 68 -- 142/87 -- 94    04/19/22 1213 100.5  (38.1) Oral 79 18 166/101 -- 98            Facility-Administered Medications as of 4/19/2022   Medication Dose Route Frequency Provider Last Rate Last Admin   • [START ON 4/20/2022] amLODIPine (NORVASC) tablet 5 mg  5 mg Oral Daily Pramod Garcia DO       • aspirin chewable tablet 324 mg  324 mg Oral Once Samir Morales DO       • [START ON 4/20/2022] losartan (COZAAR) tablet 100 mg  100 mg Oral Q24H Pramod Garcia DO       • [COMPLETED] nitroglycerin (NITROSTAT) ointment 1 inch  1 inch Topical Once Samir Morales DO   1 inch at 04/19/22 1516   • sodium chloride 0.9 % flush 10 mL  10 mL Intravenous PRN Samir Morales DO           Lab Results (last 24 hours)     Procedure Component Value Units Date/Time    POC Troponin I [[id number]]  (Normal) Collected: 04/19/22 1453    Specimen: Blood Updated: 04/19/22 1505     Troponin I 0.03 ng/mL      Comment: Serial Number: [id number]:  [id number]       Comprehensive Metabolic Panel [[id number]]  (Abnormal) Collected: 04/19/22 1228    Specimen: Blood from Arm, Right Updated: 04/19/22 1304     Glucose 130 mg/dL      BUN 10 mg/dL      Creatinine 0.88 mg/dL      Sodium 140 mmol/L      Potassium 3.4 mmol/L      Chloride 104 mmol/L      CO2 24.1 mmol/L      Calcium 8.8 mg/dL      Total Protein 7.1 g/dL      Albumin 4.50 g/dL      ALT (SGPT) 31 U/L      AST (SGOT) 16 U/L      Alkaline Phosphatase 130 U/L      Total Bilirubin 0.3 mg/dL      Globulin 2.6 gm/dL      A/G Ratio 1.7 g/dL      BUN/Creatinine Ratio 11.4     Anion Gap 11.9 mmol/L      eGFR 115.7 mL/min/1.73      Comment: National Kidney Foundation and American Society of Nephrology (ASN) Task Force recommended calculation based on the Chronic Kidney Disease Epidemiology Collaboration (CKD-EPI) equation refit without adjustment for race.       Narrative:      GFR Normal >60  Chronic Kidney Disease <60  Kidney Failure <15      Lipase [[id number]]  (Normal) Collected: 04/19/22 1228    Specimen: Blood from Arm, Right Updated: 04/19/22 1304      Lipase 35 U/L     Magnesium [[id number]]  (Normal) Collected: 04/19/22 1228    Specimen: Blood from Arm, Right Updated: 04/19/22 1304     Magnesium 2.1 mg/dL     CK Total & CKMB [[id number]]  (Normal) Collected: 04/19/22 1228    Specimen: Blood from Arm, Right Updated: 04/19/22 1304     CKMB 1.37 ng/mL      Creatine Kinase 108 U/L     Narrative:      CKMB results may be falsely decreased if patient taking Biotin.    BNP [[id number]]  (Normal) Collected: 04/19/22 1228    Specimen: Blood from Arm, Right Updated: 04/19/22 1257     proBNP 28.7 pg/mL     Narrative:      Among patients with dyspnea, NT-proBNP is highly sensitive for the detection of acute congestive heart failure. In addition NT-proBNP of <300 pg/ml effectively rules out acute congestive heart failure with 99% negative predictive value.    Results may be falsely decreased if patient taking Biotin.      POC Troponin I [[id number]]  (Normal) Collected: 04/19/22 1231    Specimen: Blood Updated: 04/19/22 1244     Troponin I 0.01 ng/mL      Comment: Serial Number: [id number]:  [id number]       CBC & Differential [[id number]]  (Normal) Collected: 04/19/22 1228    Specimen: Blood from Arm, Right Updated: 04/19/22 1237    Narrative:      The following orders were created for panel order CBC & Differential.  Procedure                               Abnormality         Status                     ---------                               -----------         ------                     CBC Auto Differential[[id number]]        Normal              Final result                 Please view results for these tests on the individual orders.    CBC Auto Differential [[id number]]  (Normal) Collected: 04/19/22 1228    Specimen: Blood from Arm, Right Updated: 04/19/22 1237     WBC 9.44 10*3/mm3      RBC 5.07 10*6/mm3      Hemoglobin 15.1 g/dL      Hematocrit 43.2 %      MCV 85.2 fL      MCH 29.8 pg      MCHC 35.0 g/dL      RDW 13.2 %      RDW-SD 41.1 fl      MPV 9.5 fL      Platelets  275 10*3/mm3      Neutrophil % 66.5 %      Lymphocyte % 23.9 %      Monocyte % 6.7 %      Eosinophil % 2.0 %      Basophil % 0.6 %      Immature Grans % 0.3 %      Neutrophils, Absolute 6.27 10*3/mm3      Lymphocytes, Absolute 2.26 10*3/mm3      Monocytes, Absolute 0.63 10*3/mm3      Eosinophils, Absolute 0.19 10*3/mm3      Basophils, Absolute 0.06 10*3/mm3      Immature Grans, Absolute 0.03 10*3/mm3      nRBC 0.0 /100 WBC     [place] Draw [[id number]] Collected: 04/19/22 1228    Specimen: Blood from Arm, Right Updated: 04/19/22 1235    Narrative:      The following orders were created for panel order [place] Draw.  Procedure                               Abnormality         Status                     ---------                               -----------         ------                     Green Top (Gel)[[id number]]                                  Final result               Lavender Top[[id number]]                                     Final result               Gold Top - SST[[id number]]                                   Final result               Light Blue Top[[id number]]                                   Final result                 Please view results for these tests on the individual orders.    Light Blue Top [[id number]] Collected: 04/19/22 1228    Specimen: Blood from Arm, Right Updated: 04/19/22 1235     Extra Tube hold for add-on     Comment: Auto resulted       Green Top (Gel) [[id number]] Collected: 04/19/22 1228    Specimen: Blood from Arm, Right Updated: 04/19/22 1235     Extra Tube Hold for add-ons.     Comment: Auto resulted.       Gold Top - SST [[id number]] Collected: 04/19/22 1228    Specimen: Blood from Arm, Right Updated: 04/19/22 1235     Extra Tube Hold for add-ons.     Comment: Auto resulted.       POC Troponin I with Hold Tube [[id number]] Collected: 04/19/22 1228    Specimen: Blood Updated: 04/19/22 1234    Narrative:      The following orders were created for panel order POC Troponin I with Hold  Tube.  Procedure                               Abnormality         Status                     ---------                               -----------         ------                     POC Troponin I[[id number]]                                                              HOLD Troponin-I Tube[[id number]]                             Final result                 Please view results for these tests on the individual orders.    HOLD Troponin-I Tube [[id number]] Collected: 04/19/22 1228    Specimen: Blood from Arm, Right Updated: 04/19/22 1234     Extra Tube Hold for add-ons.     Comment: Auto resulted.       Lavender Top [[id number]] Collected: 04/19/22 1228    Specimen: Blood from Arm, Right Updated: 04/19/22 1234     Extra Tube hold for add-on     Comment: Auto resulted           Imaging Results (Last 24 Hours)     Procedure Component Value Units Date/Time    XR Chest 1 View [[id number]] Collected: 04/19/22 1243     Updated: 04/19/22 1246    Narrative:      PROCEDURE: XR CHEST 1 VW     COMPARISON: [facility], CT, CHEST W/ CONTRAST, 10/04/2019, 15:41.  [facility], CR, CHEST AP/PA 1 VIEW, 10/04/2019, 14:24.  [facility], CR, CHEST AP/PA 1   VIEW, 8/20/2020, 6:58.     INDICATIONS: MID CHEST PAIN TODAY. PREVIOUS HEART ATTACK AND HEART STENT 2019.     FINDINGS:      The lungs are well-expanded. The heart and pulmonary vasculature are within normal limits. No   pleural effusions are identified. There are no active appearing infiltrates.     IMPRESSION: No active disease.     MADHURI LEDEZMA MD         Electronically Signed and Approved By: MADHURI LEDEZMA MD on 4/19/2022 at 12:43                         Orders (last 24 hrs)      Start     Ordered    04/20/22 0900  losartan (COZAAR) tablet 100 mg  Every 24 Hours Scheduled         04/19/22 1538    04/20/22 0900  amLODIPine (NORVASC) tablet 5 mg  Daily         04/19/22 1538    04/20/22 0001  NPO Diet NPO Type: Sips with Meds  Diet Effective  Midnight         04/19/22 1419    04/19/22 1515  nitroglycerin (NITROSTAT) ointment 1 inch  Once         04/19/22 1511    04/19/22 1506  POC Troponin I  PROCEDURE ONCE         04/19/22 1453    04/19/22 1503  Initiate Observation Status  Once         04/19/22 1507    04/19/22 1503  Code Status and Medical Interventions:  Continuous         04/19/22 1507    04/19/22 1421  POC Troponin I  PROCEDURE ONCE         04/19/22 1220    04/19/22 1421  HOLD Troponin-I Tube  PROCEDURE ONCE,   Status:  Canceled         04/19/22 1220    04/19/22 1414  Hospitalist (on-call MD unless specified)  Once        Specialty:  Hospitalist  Provider:  Fabricio Dey MD    04/19/22 1413    04/19/22 1414  Cardiology (on-call MD unless specified)  Once        Specialty:  Cardiology  Provider:  TABITHA Bullock MD    04/19/22 1413    04/19/22 1245  POC Troponin I  PROCEDURE ONCE         04/19/22 1231    04/19/22 1230  aspirin chewable tablet 324 mg  Once         04/19/22 1220    04/19/22 1221  NPO Diet NPO Type: Strict NPO  Diet Effective Now         04/19/22 1220    04/19/22 1221  Undress and Gown  Once         04/19/22 1220    04/19/22 1221  Cardiac monitoring  Continuous         04/19/22 1220    04/19/22 1221  Continuous Pulse Oximetry  Continuous         04/19/22 1220    04/19/22 1221  ECG 12 Lead  Now & in 2 Hours,   Status:  Canceled       04/19/22 1220    04/19/22 1221  XR Chest 1 View  1 Time Imaging         04/19/22 1220    04/19/22 1221  Insert peripheral IV  Once         04/19/22 1220    04/19/22 1221  [place] Draw  Once         04/19/22 1220    04/19/22 1221  POC Troponin I with Hold Tube  Now Then Every 2 Hours       04/19/22 1220    04/19/22 1221  CBC & Differential  Once         04/19/22 1220    04/19/22 1221  Comprehensive Metabolic Panel  Once         04/19/22 1220    04/19/22 1221  Lipase  Once         04/19/22 1220    04/19/22 1221  BNP  Once         04/19/22 1220    04/19/22 1221  Magnesium  Once         04/19/22 1220    04/19/22  1221  CK Total & CKMB  Once         04/19/22 1220    04/19/22 1221  Green Top (Gel)  PROCEDURE ONCE         04/19/22 1220    04/19/22 1221  Lavender Top  PROCEDURE ONCE         04/19/22 1220    04/19/22 1221  Gold Top - SST  PROCEDURE ONCE         04/19/22 1220    04/19/22 1221  Light Blue Top  PROCEDURE ONCE         04/19/22 1220    04/19/22 1221  POC Troponin I  PROCEDURE ONCE         04/19/22 1220    04/19/22 1221  HOLD Troponin-I Tube  PROCEDURE ONCE         04/19/22 1220    04/19/22 1221  CBC Auto Differential  PROCEDURE ONCE         04/19/22 1220    04/19/22 1220  sodium chloride 0.9 % flush 10 mL  As Needed         04/19/22 1220    Unscheduled  Oxygen Therapy- Nasal Cannula; 2 LPM; Titrate for SPO2: equal to or greater than, 92%  Continuous PRN       04/19/22 1220    Unscheduled  ECG 12 Lead  As Needed       04/19/22 1220    Signed and Held  Vital Signs Every 15 Minutes Until Stable, Then Every 4 Hours  Continuous         Signed and Held    Signed and Held  Cardiac Monitoring  Continuous         Signed and Held    Signed and Held  ECG 12 Lead  As Needed        Comments: Nurse to Release For Unrelieved or New Chest Pain    Signed and Held    Signed and Held  Notify Physician For Unrelieved Chest Pain  Until Discontinued         Signed and Held    Signed and Held  Intake & Output  Every Shift       Signed and Held    Signed and Held  Weigh Patient  Once         Signed and Held    Signed and Held  Daily Weights  Daily       Signed and Held    Signed and Held  Oxygen Therapy- Nasal Cannula; Titrate for SPO2: 90% - 95%  Continuous         Signed and Held    Signed and Held  Troponin  Once        Comments: Perform Draw 6 Hours After First Troponin in ED      Signed and Held    Signed and Held  Basic Metabolic Panel  Daily       Signed and Held    Signed and Held  Magnesium  Daily       Signed and Held    Signed and Held  Insert Peripheral IV  Once         Signed and Held    Signed and Held  Saline Lock & Maintain  "IV Access  Continuous         Signed and Held    Signed and Held  sodium chloride 0.9 % flush 10 mL  Every 12 Hours Scheduled         Signed and Held    Signed and Held  sodium chloride 0.9 % flush 10 mL  As Needed         Signed and Held    Signed and Held  nitroglycerin (NITROSTAT) SL tablet 0.4 mg  Every 5 Minutes PRN         Signed and Held    Signed and Held  atorvastatin (LIPITOR) tablet 40 mg  Nightly         Signed and Held    Signed and Held  acetaminophen (TYLENOL) tablet 650 mg  Every 4 Hours PRN         Signed and Held    Signed and Held  aluminum-magnesium hydroxide-simethicone (MAALOX MAX) 400-400-40 MG/5ML suspension 15 mL  Every 6 Hours PRN         Signed and Held    Signed and Held  ondansetron (ZOFRAN) injection 4 mg  Every 6 Hours PRN         Signed and Held    Signed and Held  enoxaparin (LOVENOX) syringe 40 mg  Every 24 Hours         Signed and Held    Signed and Held  NPO Diet NPO Type: Strict NPO  Diet Effective Midnight         Signed and Held    Signed and Held  Diet Regular  Diet Effective Now         Signed and Held    Signed and Held  Adult Transthoracic Echo Complete With Contrast if Necessary Per Protocol  Once         Signed and Held    Signed and Held  Reason For Not Ordering Aspirin on Admission  Once         Signed and Held    Signed and Held  Reason For Not Ordering Antithrombotic On Admission  Once         Signed and Held    Signed and Held  Reason for Not Ordering Beta-Blocker on Admission  Once         Signed and Held    Signed and Held  Reason For Not Ordering ACEI or ARB On Admission  Once         Signed and Held    Signed and Held  morphine injection 1 mg  Every 4 Hours PRN        \"And\" Linked Group Details    Signed and Held    Signed and Held  naloxone (NARCAN) injection 0.4 mg  Every 5 Minutes PRN        \"And\" Linked Group Details    Signed and Held    Signed and Held  HYDROcodone-acetaminophen (NORCO) 5-325 MG per tablet 1 tablet  Every 4 Hours PRN         Signed and Held "    Signed and Held  Inpatient Cardiology Consult  Once        Specialty:  Cardiology  Provider:  TABITHA Bullock MD    Signed and Held    --  atorvastatin (LIPITOR) 40 MG tablet  Nightly         04/19/22 1516    --  losartan (COZAAR) 100 MG tablet  Daily         04/19/22 1516    --  amLODIPine (NORVASC) 5 MG tablet  Daily         04/19/22 1516

## 2022-04-20 ENCOUNTER — APPOINTMENT (OUTPATIENT)
Dept: NUCLEAR MEDICINE | Facility: HOSPITAL | Age: 37
End: 2022-04-20

## 2022-04-20 VITALS
WEIGHT: 257.5 LBS | TEMPERATURE: 98.1 F | BODY MASS INDEX: 40.42 KG/M2 | DIASTOLIC BLOOD PRESSURE: 79 MMHG | SYSTOLIC BLOOD PRESSURE: 147 MMHG | OXYGEN SATURATION: 98 % | HEIGHT: 67 IN | RESPIRATION RATE: 18 BRPM | HEART RATE: 82 BPM

## 2022-04-20 PROBLEM — F17.200 SMOKER: Status: ACTIVE | Noted: 2022-04-20

## 2022-04-20 PROBLEM — I25.708 CORONARY ARTERY DISEASE OF BYPASS GRAFT OF NATIVE HEART WITH STABLE ANGINA PECTORIS: Status: ACTIVE | Noted: 2022-04-20

## 2022-04-20 LAB
ANION GAP SERPL CALCULATED.3IONS-SCNC: 11.9 MMOL/L (ref 5–15)
AORTIC DIMENSIONLESS INDEX: 0.8 (DI)
BH CV ECHO MEAS - AO MAX PG: 6 MMHG
BH CV ECHO MEAS - AO MEAN PG: 4 MMHG
BH CV ECHO MEAS - AO ROOT DIAM: 2.8 CM
BH CV ECHO MEAS - AO V2 MAX: 120 CM/SEC
BH CV ECHO MEAS - AO V2 VTI: 25.1 CM
BH CV ECHO MEAS - EDV(MOD-SP2): 62.7 ML
BH CV ECHO MEAS - EDV(MOD-SP4): 105 ML
BH CV ECHO MEAS - EF(MOD-BP): 65 %
BH CV ECHO MEAS - ESV(MOD-SP2): 23.4 ML
BH CV ECHO MEAS - ESV(MOD-SP4): 36.4 ML
BH CV ECHO MEAS - IVSD: 1 CM
BH CV ECHO MEAS - LA A2CS (ATRIAL LENGTH): 5.9 CM
BH CV ECHO MEAS - LA DIMENSION(2D): 4.1 CM
BH CV ECHO MEAS - LAT PEAK E' VEL: 8.3 CM/SEC
BH CV ECHO MEAS - LV MAX PG: 4 MMHG
BH CV ECHO MEAS - LV MEAN PG: 2 MMHG
BH CV ECHO MEAS - LV V1 MAX: 96 CM/SEC
BH CV ECHO MEAS - LV V1 VTI: 18.8 CM
BH CV ECHO MEAS - LVIDD: 4.5 CM
BH CV ECHO MEAS - LVIDS: 3 CM
BH CV ECHO MEAS - LVOT DIAM: 2 CM
BH CV ECHO MEAS - LVPWD: 1 CM
BH CV ECHO MEAS - MED PEAK E' VEL: 7.8 CM/SEC
BH CV ECHO MEAS - MV A MAX VEL: 65.6 CM/SEC
BH CV ECHO MEAS - MV DEC SLOPE: 328 CM/SEC2
BH CV ECHO MEAS - MV DEC TIME: 229 MSEC
BH CV ECHO MEAS - MV E MAX VEL: 75 CM/SEC
BH CV ECHO MEAS - MV E/A: 1.1
BH CV ECHO MEAS - MV MAX PG: 3 MMHG
BH CV ECHO MEAS - MV MEAN PG: 1 MMHG
BH CV ECHO MEAS - MV P1/2T: 43 MSEC
BH CV ECHO MEAS - MV V2 VTI: 26 CM
BH CV ECHO MEAS - MVA(P1/2T): 5.1 CM2
BH CV ECHO MEAS - PA V2 MAX: 69 CM/SEC
BH CV ECHO MEAS - RVDD: 2.8 CM
BH CV ECHO MEASUREMENTS AVERAGE E/E' RATIO: 9.32
BH CV IMMEDIATE POST TECH DATA BLOOD PRESSURE: NORMAL MMHG
BH CV IMMEDIATE POST TECH DATA HEART RATE: 103 BPM
BH CV IMMEDIATE POST TECH DATA OXYGEN SATS: 95 %
BH CV REST NUCLEAR ISOTOPE DOSE: 9.7 MCI
BH CV SIX MINUTE RECOVERY TECH DATA BLOOD PRESSURE: NORMAL
BH CV SIX MINUTE RECOVERY TECH DATA HEART RATE: 89 BPM
BH CV SIX MINUTE RECOVERY TECH DATA OXYGEN SATURATION: 93 %
BH CV STRESS BP STAGE 1: NORMAL
BH CV STRESS BP STAGE 2: NORMAL
BH CV STRESS COMMENTS STAGE 1: NORMAL
BH CV STRESS COMMENTS STAGE 2: NORMAL
BH CV STRESS DOSE REGADENOSON STAGE 1: 0.4
BH CV STRESS DURATION MIN STAGE 1: 0
BH CV STRESS DURATION MIN STAGE 2: 4
BH CV STRESS DURATION SEC STAGE 1: 10
BH CV STRESS DURATION SEC STAGE 2: 0
BH CV STRESS GRADE STAGE 1: 10
BH CV STRESS HR STAGE 1: 124
BH CV STRESS HR STAGE 2: 122
BH CV STRESS METS STAGE 1: 5
BH CV STRESS NUCLEAR ISOTOPE DOSE: 36.3 MCI
BH CV STRESS O2 STAGE 2: 97
BH CV STRESS PROTOCOL 1: NORMAL
BH CV STRESS RECOVERY BP: NORMAL MMHG
BH CV STRESS RECOVERY HR: 89 BPM
BH CV STRESS RECOVERY O2: 93 %
BH CV STRESS SPEED STAGE 1: 1.7
BH CV STRESS STAGE 1: 1
BH CV STRESS STAGE 2: 2
BH CV THREE MINUTE POST TECH DATA BLOOD PRESSURE: NORMAL MMHG
BH CV THREE MINUTE POST TECH DATA HEART RATE: 100 BPM
BH CV THREE MINUTE POST TECH DATA OXYGEN SATURATION: 96 %
BUN SERPL-MCNC: 10 MG/DL (ref 6–20)
BUN/CREAT SERPL: 11.4 (ref 7–25)
CALCIUM SPEC-SCNC: 8.7 MG/DL (ref 8.6–10.5)
CHLORIDE SERPL-SCNC: 104 MMOL/L (ref 98–107)
CO2 SERPL-SCNC: 23.1 MMOL/L (ref 22–29)
CREAT SERPL-MCNC: 0.88 MG/DL (ref 0.76–1.27)
EGFRCR SERPLBLD CKD-EPI 2021: 115.7 ML/MIN/1.73
GLUCOSE SERPL-MCNC: 130 MG/DL (ref 65–99)
IVRT: 77 MSEC
LEFT ATRIUM VOLUME INDEX: 20.4 ML/M2
LEFT ATRIUM VOLUME: 38.7 CM3
LV EF NUC BP: 50 %
MAGNESIUM SERPL-MCNC: 2.1 MG/DL (ref 1.6–2.6)
MAXIMAL PREDICTED HEART RATE: 186 BPM
MAXIMAL PREDICTED HEART RATE: 186 BPM
PERCENT MAX PREDICTED HR: 66.67 %
POTASSIUM SERPL-SCNC: 3.5 MMOL/L (ref 3.5–5.2)
SODIUM SERPL-SCNC: 139 MMOL/L (ref 136–145)
STRESS BASELINE BP: NORMAL MMHG
STRESS BASELINE HR: 69 BPM
STRESS O2 SAT REST: 95 %
STRESS PERCENT HR: 78 %
STRESS POST O2 SAT PEAK: 96 %
STRESS POST PEAK BP: NORMAL MMHG
STRESS POST PEAK HR: 124 BPM
STRESS TARGET HR: 158 BPM
STRESS TARGET HR: 158 BPM

## 2022-04-20 PROCEDURE — 93016 CV STRESS TEST SUPVJ ONLY: CPT | Performed by: NURSE PRACTITIONER

## 2022-04-20 PROCEDURE — G0378 HOSPITAL OBSERVATION PER HR: HCPCS

## 2022-04-20 PROCEDURE — 93018 CV STRESS TEST I&R ONLY: CPT | Performed by: INTERNAL MEDICINE

## 2022-04-20 PROCEDURE — 83735 ASSAY OF MAGNESIUM: CPT | Performed by: INTERNAL MEDICINE

## 2022-04-20 PROCEDURE — 25010000002 SULFUR HEXAFLUORIDE MICROSPH 60.7-25 MG RECONSTITUTED SUSPENSION: Performed by: INTERNAL MEDICINE

## 2022-04-20 PROCEDURE — 99217 PR OBSERVATION CARE DISCHARGE MANAGEMENT: CPT | Performed by: INTERNAL MEDICINE

## 2022-04-20 PROCEDURE — A9502 TC99M TETROFOSMIN: HCPCS | Performed by: INTERNAL MEDICINE

## 2022-04-20 PROCEDURE — 25010000002 REGADENOSON 0.4 MG/5ML SOLUTION: Performed by: INTERNAL MEDICINE

## 2022-04-20 PROCEDURE — 93017 CV STRESS TEST TRACING ONLY: CPT

## 2022-04-20 PROCEDURE — 78452 HT MUSCLE IMAGE SPECT MULT: CPT

## 2022-04-20 PROCEDURE — 99214 OFFICE O/P EST MOD 30 MIN: CPT | Performed by: INTERNAL MEDICINE

## 2022-04-20 PROCEDURE — 0 TECHNETIUM TETROFOSMIN KIT: Performed by: INTERNAL MEDICINE

## 2022-04-20 PROCEDURE — 80048 BASIC METABOLIC PNL TOTAL CA: CPT | Performed by: INTERNAL MEDICINE

## 2022-04-20 PROCEDURE — 78452 HT MUSCLE IMAGE SPECT MULT: CPT | Performed by: INTERNAL MEDICINE

## 2022-04-20 RX ORDER — ISOSORBIDE MONONITRATE 30 MG/1
30 TABLET, EXTENDED RELEASE ORAL
Status: DISCONTINUED | OUTPATIENT
Start: 2022-04-20 | End: 2022-04-20 | Stop reason: HOSPADM

## 2022-04-20 RX ORDER — RANOLAZINE 500 MG/1
500 TABLET, EXTENDED RELEASE ORAL EVERY 12 HOURS SCHEDULED
Status: DISCONTINUED | OUTPATIENT
Start: 2022-04-20 | End: 2022-04-20 | Stop reason: HOSPADM

## 2022-04-20 RX ORDER — ASPIRIN 81 MG/1
81 TABLET ORAL DAILY
Qty: 90 TABLET | Refills: 33 | Status: SHIPPED | OUTPATIENT
Start: 2022-04-20

## 2022-04-20 RX ORDER — RANOLAZINE 500 MG/1
500 TABLET, EXTENDED RELEASE ORAL EVERY 12 HOURS SCHEDULED
Qty: 180 TABLET | Refills: 1 | Status: SHIPPED | OUTPATIENT
Start: 2022-04-20

## 2022-04-20 RX ORDER — ISOSORBIDE MONONITRATE 30 MG/1
30 TABLET, EXTENDED RELEASE ORAL
Qty: 90 TABLET | Refills: 1 | Status: SHIPPED | OUTPATIENT
Start: 2022-04-20

## 2022-04-20 RX ORDER — NITROGLYCERIN 0.4 MG/1
0.4 TABLET SUBLINGUAL
Qty: 25 TABLET | Refills: 12 | Status: SHIPPED | OUTPATIENT
Start: 2022-04-20

## 2022-04-20 RX ADMIN — Medication 10 ML: at 08:19

## 2022-04-20 RX ADMIN — RANOLAZINE 500 MG: 500 TABLET, EXTENDED RELEASE ORAL at 14:39

## 2022-04-20 RX ADMIN — SULFUR HEXAFLUORIDE 2 ML: KIT at 01:02

## 2022-04-20 RX ADMIN — ISOSORBIDE MONONITRATE 30 MG: 30 TABLET, EXTENDED RELEASE ORAL at 14:39

## 2022-04-20 RX ADMIN — TETROFOSMIN 1 DOSE: 1.38 INJECTION, POWDER, LYOPHILIZED, FOR SOLUTION INTRAVENOUS at 09:44

## 2022-04-20 RX ADMIN — LOSARTAN POTASSIUM 100 MG: 50 TABLET, FILM COATED ORAL at 08:19

## 2022-04-20 RX ADMIN — TETROFOSMIN 1 DOSE: 1.38 INJECTION, POWDER, LYOPHILIZED, FOR SOLUTION INTRAVENOUS at 11:49

## 2022-04-20 RX ADMIN — AMLODIPINE BESYLATE 5 MG: 5 TABLET ORAL at 08:19

## 2022-04-20 RX ADMIN — REGADENOSON 0.4 MG: 0.08 INJECTION, SOLUTION INTRAVENOUS at 11:48

## 2022-04-20 NOTE — CONSULTS
Cardiology Consult Note  [facility] PROGRESSIVE CARE UNIT 2          Patient Identification:  Deo Pacheco      [id number]  36 y.o.        male  1985       Date of Consultation: April 20    Reason for Consultation: Chest pain    PCP: Blanco Angelo MD  Primary cardiologist: [place]    History of Present Illness:     36-year-old white male.  He has known coronary artery disease.  He has had previous PTCA in 2019, this was to a 100% diagonal branch occlusion.  There was branch vessel disease in the circumflex marginals as well.  He had another catheterization approximately 1 year later with FFR negative RCA, stable diffuse disease in the marginal branches not amenable to PCI, severe diffuse disease in the diagonal branch.  This was managed medically.    The patient presented to the emergency room with approximately 1 hour duration of chest pressure, with numbness in the medial portion of the left forearm and hand.  It has resolved with nitroglycerin.  His EKG showed no acute changes and biomarkers negative.  He has been comfortable overnight.  He continues to smoke.  He reports he is compliant with his medications.    Past History:  Past Medical History:   Diagnosis Date   • Coronary artery disease    • Epilepsy with status epilepticus (HCC) 2015   • Hypertension      Past Surgical History:   Procedure Laterality Date   • CARDIAC CATHETERIZATION     • CORONARY ANGIOPLASTY WITH STENT PLACEMENT Right 2019     No Known Allergies  Social History     Socioeconomic History   • Marital status: Significant Other   Tobacco Use   • Smoking status: Current Every Day Smoker     Packs/day: 0.50     Years: 15.00     Pack years: 7.50     Types: Cigarettes     Start date: 2007   Vaping Use   • Vaping Use: Never used   Substance and Sexual Activity   • Alcohol use: Never   • Drug use: Never   • Sexual activity: Defer     Family History   Problem Relation Age of Onset   • Cancer Mother    • Diabetes Father   "  • Cancer Father      Medications:  Medications Prior to Admission   Medication Sig Dispense Refill Last Dose   • amLODIPine (NORVASC) 5 MG tablet Take 5 mg by mouth Daily.   4/18/2022 at Unknown time   • atorvastatin (LIPITOR) 40 MG tablet Take 40 mg by mouth Every Night.   4/18/2022 at Unknown time   • losartan (COZAAR) 100 MG tablet Take 100 mg by mouth Daily.   4/18/2022 at Unknown time     Current medications:  amLODIPine, 5 mg, Oral, Daily  aspirin, 324 mg, Oral, Once  atorvastatin, 40 mg, Oral, Nightly  enoxaparin, 40 mg, Subcutaneous, Q24H  losartan, 100 mg, Oral, Q24H  sodium chloride, 10 mL, Intravenous, Q12H      Current IV drips:       Review of Systems   Constitutional: Negative.   HENT: Negative.    Eyes: Negative.    Cardiovascular: Positive for chest pain and dyspnea on exertion.   Respiratory: Positive for shortness of breath.    Endocrine: Negative.    Hematologic/Lymphatic: Negative.    Skin: Negative.    Musculoskeletal: Negative.    Gastrointestinal: Negative.    Genitourinary: Negative.    Neurological: Negative.    Psychiatric/Behavioral: Negative.          Physical exam:    BP (!) 165/109 (BP Location: Left arm, Patient Position: Lying)   Pulse 79   Temp 99.1 °F (37.3 °C) (Oral)   Resp 20   Ht 170.2 cm (67\")   Wt 117 kg (257 lb 8 oz)   SpO2 97%   BMI 40.33 kg/m²  Body mass index is 40.33 kg/m².   Oxygen saturation   @FLOWAN(10::1)@ SpO2  Min: 94 %  Max: 98 %    General Appearance:   · well developed  · well nourished, obese  HENT:   · oropharynx moist  · lips not cyanotic  Neck:  · thyroid not enlarged  · supple  Respiratory:  · no respiratory distress  · normal breath sounds  · no rales  Cardiovascular:  · no jugular venous distention  · regular rhythm  · apical impulse normal  · S1 normal, S2 normal  · no S3, no S4   · no murmur  · no rub, no thrill  · carotid pulses normal; no bruit  · pedal pulses normal  · lower extremity edema: none    Gastrointestinal:   · bowel sounds " normal  · non-tender  · no hepatomegaly, no splenomegaly  Musculoskeletal:  · no clubbing of fingers.   · normocephalic, head atraumatic  Skin:   · warm, dry  Neuro/Psychiatric:  · judgement and insight appropriate  · normal mood and affect    Cardiographics:     ECG  (personally reviewed) sinus rhythm, nonspecific inferolateral ST abnormality   Telemetry:  (personally reviewed)    ECHO: Pending   CATH: Reviewed from 2019, 2020   CARDIOLITE:      Lab Review:       Results from last 7 days   Lab Units 04/19/22  1228   WBC 10*3/mm3 9.44   HEMOGLOBIN g/dL 15.1   HEMATOCRIT % 43.2            Results from last 7 days   Lab Units 04/19/22  1228   SODIUM mmol/L 140   BUN mg/dL 10   CREATININE mg/dL 0.88   GLUCOSE mg/dL 130*      Estimated Creatinine Clearance: 144.7 mL/min (by C-G formula based on SCr of 0.88 mg/dL).         Invalid input(s): LDLCALC        No results found for: TSH   No results found for: HGBA1C        No results found for: DIGOXIN   No components found for: DDIMERQUAN     Imaging:   [unfilled]    Assessment:      Chest pain    Coronary artery disease of bypass graft of native heart with stable angina pectoris (HCC)    Smoker      Initial cardiac assessment: 36-year-old white male with known coronary artery disease.  Presents with chest pain with some features of angina.  EKG shows nonspecific inferolateral ST changes.  Biomarkers negative.  BNP negative.      Recommendations:  1.  Stress imaging will be scheduled this morning to evaluate for significant ischemia burden.  2.  The patient currently is not interested in quitting smoking  3.  Continue aspirin, high potency statin, amlodipine/ARB, follow blood pressures  4.  If no high risk ischemia will adjust antianginal therapy and reestablish outpatient follow-up, the patient had not presented to the office in about a year and a half.  The patient is agreeable with this plan                Quique Bullock MD  4/20/2022    08:20 EDT

## 2022-04-20 NOTE — PLAN OF CARE
Goal Outcome Evaluation:           Progress: no change  Outcome Evaluation: No chest pain throughout shift. Spouse at bedside, patient rested well throughout shift. VSS. NPO after midnight for possible heart cath this AM.

## 2022-04-20 NOTE — DISCHARGE SUMMARY
[facility]         HOSPITALIST  DISCHARGE SUMMARY    Patient Name: Deo Pacheco  : 1985  MRN: [id number]    Date of Admission: 2022  Date of Discharge:  2022    Primary Care Physician: Blanco Angelo MD    Consults     Date and Time Order Name Status Description    2022  5:28 PM Inpatient Cardiology Consult      2022  2:13 PM Cardiology (on-call MD unless specified)      2022  2:13 PM Hospitalist (on-call MD unless specified)            Active and Resolved Hospital Problems:  Active Hospital Problems    Diagnosis POA   • Coronary artery disease of bypass graft of native heart with stable angina pectoris (HCC) [I25.708] Unknown   • Smoker [F17.200] Unknown   • Chest pain [R07.9] Yes      Resolved Hospital Problems   No resolved problems to display.       Hospital Course     Hospital Course:  · Deo Pacheco is a 36 y.o. male . male with history of coronary artery disease with stent placement in 2019 who presented to the ER with chest pain.  Patient states his chest pain started about 1030 this morning he states it was a combination of intermittent sharp pain as well as pressure pain.  It was on the left side of his chest.  He said he felt some numbness tingling down his arm and in his last 2 fingers.  He states that he took a nitroglycerin and his chest pain eventually resolved.  Patient was chest pain-free when I saw him in the ER however he stated he felt like his chest pain was coming back and it was more of a pressure pain.  Patient denies any nausea or vomiting but patient states he has not eaten most of the day.  Patient's work-up in the ER was unremarkable.  His vital signs were stable.  His EKG was fine.  His troponins were negative as well as his laboratory data however given his past history of cardiac disease and history of stent placement Hospital service was asked for observation with cardiology consult.  Patient was seen by cardiology.   Patient had an echocardiogram that showed normal EF.  Left ventricular wall thickness was consistent with borderline hypertrophy.  Diastolic function was normal.  Cardiology ordered a stress test which showed Left ventricular ejection fraction is normal. (Calculated EF = 50%). Myocardial perfusion imaging indicates a small-sized, mildly severe area of ischemia located in the apical lateral segment.Impressions are consistent with an intermediate risk study. Findings consistent with a normal ECG stress test.  Patient has been started on Ranexa and Imdur and okay to discharge per cardiology.  Patient remains chest pain-free.  Patient be discharged home in stable condition.  Patient would follow-up with primary care doctor in the next week and also will need to follow-up with cardiology in the next 1 to 2 weeks.  If patient should have any further chest pain then he needs to return to the ER immediately.          Day of Discharge     Vital Signs:  Temp:  [98 °F (36.7 °C)-99.1 °F (37.3 °C)] 98.1 °F (36.7 °C)  Heart Rate:  [64-82] 82  Resp:  [19-20] 20  BP: (123-169)/() 147/79  Physical Exam:               Constitutional: Awake, alert, no acute distress resting in bed eating pizza and mountain dew with wife at bedside               Eyes: Pupils equal, sclerae anicteric, no conjunctival injection              HENT: NCAT, mucous membranes moist              Neck: Supple              Respiratory: Clear to auscultation bilaterally, nonlabored respirations no wheezing, rales or rhonchi noted               Cardiovascular: RRR, no murmurs,              Gastrointestinal: Positive bowel sounds, soft, nontender, nondistended              Musculoskeletal: No bilateral ankle edema, no clubbing or cyanosis to extremities              Psychiatric: Appropriate affect, cooperative              Neurologic: Oriented x 3, strength symmetric in all extremities, Cranial Nerves grossly intact to confrontation, speech clear               Skin: No rashes       Discharge Details        Discharge Medications      New Medications      Instructions Start Date   aspirin 81 MG EC tablet   81 mg, Oral, Daily      isosorbide mononitrate 30 MG 24 hr tablet  Commonly known as: IMDUR   30 mg, Oral, Every 24 Hours Scheduled      nitroglycerin 0.4 MG SL tablet  Commonly known as: Nitrostat   0.4 mg, Sublingual, Every 5 Minutes PRN, Take no more than 3 doses in 15 minutes.      ranolazine 500 MG 12 hr tablet  Commonly known as: RANEXA   500 mg, Oral, Every 12 Hours Scheduled         Continue These Medications      Instructions Start Date   amLODIPine 5 MG tablet  Commonly known as: NORVASC   5 mg, Oral, Daily      atorvastatin 40 MG tablet  Commonly known as: LIPITOR   40 mg, Oral, Nightly      losartan 100 MG tablet  Commonly known as: COZAAR   100 mg, Oral, Daily             No Known Allergies    Discharge Disposition:  Home or Self Care    Diet:  Hospital:  Diet Order   Procedures   • Diet Regular; Cardiac       Discharge Activity:  As tolerated       CODE STATUS:  Code Status and Medical Interventions:   Ordered at: 04/19/22 1507     Code Status (Patient has no pulse and is not breathing):    CPR (Attempt to Resuscitate)     Medical Interventions (Patient has pulse or is breathing):    Full Support         No future appointments.    Additional Instructions for the Follow-ups that You Need to Schedule     Discharge Follow-up with PCP   As directed       Currently Documented PCP:    Blanco Angelo MD    PCP Phone Number:    [phone]     Follow Up Details: in 1 week         Discharge Follow-up with Specified Provider: Dr. Bullock; 2 Weeks   As directed      To: Dr. Bullock    Follow Up: 2 Weeks               Pertinent  and/or Most Recent Results     PROCEDURES:   Stress test       LAB RESULTS:      Lab 04/19/22  1228   WBC 9.44   HEMOGLOBIN 15.1   HEMATOCRIT 43.2   PLATELETS 275   NEUTROS ABS 6.27   IMMATURE GRANS (ABS) 0.03   LYMPHS ABS 2.26   MONOS ABS  0.63   EOS ABS 0.19   MCV 85.2         Lab 04/20/22  0939 04/20/22  0528 04/19/22  1228   SODIUM 139  --  140   POTASSIUM 3.5  --  3.4*   CHLORIDE 104  --  104   CO2 23.1  --  24.1   ANION GAP 11.9  --  11.9   BUN 10  --  10   CREATININE 0.88  --  0.88   EGFR 115.7  --  115.7   GLUCOSE 130*  --  130*   CALCIUM 8.7  --  8.8   MAGNESIUM  --  2.1 2.1         Lab 04/19/22  1228   TOTAL PROTEIN 7.1   ALBUMIN 4.50   GLOBULIN 2.6   ALT (SGPT) 31   AST (SGOT) 16   BILIRUBIN 0.3   ALK PHOS 130*   LIPASE 35         Lab 04/19/22  1228   PROBNP 28.7   TROPONIN T <0.010                 Brief Urine Lab Results     None        Microbiology Results (last 10 days)     ** No results found for the last 240 hours. **                       Results for orders placed during the hospital encounter of 04/19/22    Adult Transthoracic Echo Complete With Contrast if Necessary Per Protocol    Interpretation Summary  · Left ventricular wall thickness is consistent with borderline concentric hypertrophy.  · Left ventricular ejection fraction appears to be 61 - 65%.  · Left ventricular diastolic function was normal.  · No evidence of significant valvular disease      Labs Pending at Discharge:  Pending Labs     Order Current Status    POC Troponin I with Hold Tube In process              Electronically signed by Pramod Garcia DO, 04/20/22, 4:09 PM EDT.

## 2022-04-21 ENCOUNTER — READMISSION MANAGEMENT (OUTPATIENT)
Dept: CALL CENTER | Facility: HOSPITAL | Age: 37
End: 2022-04-21

## 2022-04-21 NOTE — PAYOR COMM NOTE
"Deo Yan (36 y.o. Male)             Date of Birth   1985    Social Security Number       Address   [address] [place] [address]    Home Phone   [phone]    MRN   [id number]       Buddhist   None    Marital Status   Significant Other                            Admission Date   4/19/22    Admission Type   Emergency    Admitting Provider   Pramod Garcia DO    Attending Provider       Department, Room/Bed   [facility] PROGRESSIVE CARE UNIT 2, 259/1       Discharge Date   4/20/2022    Discharge Disposition   Home or Self Care    Discharge Destination                               Attending Provider: (none)   Allergies: No Known Allergies    Isolation: None   Infection: None   Code Status: Prior   Advance Care Planning Activity    Ht: 170.2 cm (67\")   Wt: 117 kg (257 lb 8 oz)    Admission Cmt: None   Principal Problem: None                Active Insurance as of 4/19/2022     Primary Coverage     Payor Plan Insurance Group Employer/Plan Group    [employer] BLUE [employer]OSS BLUE [employer] PPO [id number]     Payor Plan Address Payor Plan Phone Number Payor Plan Fax Number Effective Dates    [address] [phone]  1/1/2022 - None Entered    [place] [id number]       Subscriber Name Subscriber Birth Date Member ID       DEO YAN 1985 [id number]                 Emergency Contacts      (Rel.) Home Phone Work Phone Mobile Phone    TORI VALERA (Significant Other) -- -- [phone]        AVAILITY RECORD ID PENDING# [id number]  PER FANI REF# [id number], FOR OVER 23 HOUR OBSERVATION STATUS OBS 4/19 DC 4/20  FAXED PER REQUEST.    TELE BED    CONTACT   ANA S UTILIZATION REVIEW    [facility]  [address] [place] [address]  TAX ID [id number]  NPI  [id number]    PH   [phone]   [phone]        Chest Pain RRG - Observation Care by Hina Napoles, RN         Met (Selected): Reviewed on 4/19/2022 by Hina Napoles, " RN       Created Using Review Status Review Entered   Indicia® Completed 2022 16:57       Criteria Set Name - Subset   Chest Pain RRG - Observation Care       Selected?   Yes - Observation Care is selected for the Chest Pain RRG criteria set.      Criteria Review      Observation Care    Most Recent : Hina Napoles Most Recent Date: 2022 16:57:02 EDST    (X) Observation care is indicated for  1 or more  of the following :       (X) Suspected cardiac ischemia with nondiagnostic initial evaluation (eg, ECG, cardiac biomarkers)       requiring further evaluation (eg, serial troponin tests) [A] [B]       2022 16:57:02 EDST by Hina Napoles         EKG WNL. Trops neg. Has Hx of CAD & stent placement. Keep overnight for serial troponins.    Notes:    2022 16:57:02 EDST by Hina Napoles    Subject: Admission    To ED c/o CP rad down arm. Relieved with Ntg SL before returning. Hx CAD & Cardiac stents. EKG & Initial trops neg. IN ED received NTG ointment. Admit as OBS. Cardiology consult. Continue troponins. NPO; ASA qd/ Cozaar QD. Nitrostat ointment q 6 hrs. Lovenox sq qd; ECHO.           History & Physical      Pramod Garcia DO at [address] [place] [address] HISTORY AND PHYSICAL  Date: 2022   Patient Name: Deo Pacheco  : 1985  MRN: [id number]  Primary Care Physician:  Blanco Angelo MD  Date of admission: 2022    Subjective   Subjective     Chief Complaint: chest pain     HPI:    Deo Pacheco is a 36 y.o. male with history of coronary artery disease with stent placement in 2019 who presented to the ER with chest pain.  Patient states his chest pain started about 1030 this morning he states it was a combination of intermittent sharp pain as well as pressure pain.  It was on the left side of his chest.  He said he felt some numbness tingling down his arm and in his last 2 fingers.  He states that he took a nitroglycerin and his chest  pain eventually resolved.  Patient was chest pain-free when I saw him in the ER however he stated he felt like his chest pain was coming back and it was more of a pressure pain.  Patient denies any nausea or vomiting but patient states he has not eaten most of the day.  Patient's work-up in the ER was unremarkable.  His vital signs were stable.  His EKG was fine.  His troponins were negative as well as his laboratory data however given his past history of cardiac disease and history of stent placement Hospital service was asked for observation with cardiology consult      Personal History     Past Medical History:  Coronary artery disease with history of stent placement in 2019  Tobacco abuse chronic and ongoing  Hypertension  Super morbid obesity with a BMI of 41  History of seizure disorder  Hyperlipidemia        Past Surgical History:  Coronary artery stent placement to the diagonal branch in 2019    Family History:   Positive for hypertension  Social History:   Patient smokes about half pack per day patient denies any alcohol or drug use  Patient's hockey patient is a   Home Medications:  amLODIPine, atorvastatin, and losartan    Allergies:  No Known Allergies    Review of Systems  History obtained from the patient  General ROS:  Negative for - chills, fever, malaise, or night sweats  Psychological ROS: negative for - anxiety, depression, hallucinations, or mood swings  Ophthalmic ROS: negative for - blurry vision, double vision, or itchy eyes  ENT ROS: negative for - headaches, nasal congestion, sneezing, or sore throat  Hematological and Lymphatic ROS: negative for - bleeding problems, bruising, or jaundice  Endocrine ROS: negative for - malaise/lethargy, polydipsia/polyuria, or temperature intolerance  Respiratory ROS: negative for - cough, orthopnea, shortness of breath, sputum changes, tachypnea, or wheezing  Cardiovascular ROS: Positive for intermittent chest pain/pressure.  No palpitations.     Gastrointestinal ROS: negative for - abdominal pain, constipation, diarrhea, heartburn, hematemesis, or nausea/vomiting  Genito-Urinary ROS: negative for - change in urinary stream, hematuria, incontinence, or urinary frequency/urgency  Musculoskeletal ROS: negative for - joint stiffness, joint swelling, muscle pain, or muscular weakness  Neurological ROS: negative for - confusion, dizziness, gait disturbance, headaches, impaired coordination/balance, memory loss, numbness/tingling, seizures, or visual changes  Dermatological ROS: negative for dry skin and rash       Objective   Objective     Vitals:   Temp:  [100.5 °F (38.1 °C)] 100.5 °F (38.1 °C)  Heart Rate:  [68-79] 68  Resp:  [18] 18  BP: (142-166)/() 142/87    Physical Exam    Constitutional: Awake, alert, no acute distress LYING IN HOSPITAL BED IN ER WITH WIFE AT BEDSIDE    Eyes: Pupils equal, sclerae anicteric, no conjunctival injection   HENT: NCAT, mucous membranes moist   Neck: Supple, no thyromegaly, no lymphadenopathy, trachea midline   Respiratory: Clear to auscultation bilaterally, nonlabored respirations no wheezing, rales or rhonchi noted    Cardiovascular: RRR, no murmurs,   Gastrointestinal: Positive bowel sounds, soft, nontender, nondistended   Musculoskeletal: No bilateral ankle edema, no clubbing or cyanosis to extremities   Psychiatric: Appropriate affect, cooperative   Neurologic: Oriented x 3, strength symmetric in all extremities, Cranial Nerves grossly intact to confrontation, speech clear   Skin: No rashes     Result Review    Result Review:  I have personally reviewed the results from the time of this admission to 4/19/2022 15:28 EDT and agree with these findings:  [x]  Laboratory   CBC    CBC 4/19/22   WBC 9.44   RBC 5.07   Hemoglobin 15.1   Hematocrit 43.2   MCV 85.2   MCH 29.8   MCHC 35.0   RDW 13.2   Platelets 275           BMP    BMP 4/19/22   BUN 10   Creatinine 0.88   Sodium 140   Potassium 3.4 (A)   Chloride 104   CO2  24.1   Calcium 8.8   (A) Abnormal value              [x]  Microbiology   No results found for: ACANTHNAEG, AFBCX, BPERTUSSISCX, BLOODCX  No results found for: BCIDPCR, CXREFLEX, CSFCX, CULTURETIS  No results found for: CULTURES, HSVCX, URCX  No results found for: EYECULTURE, GCCX, HSVCULTURE, LABHSV  No results found for: LEGIONELLA, MRSACX, MUMPSCX, MYCOPLASCX  No results found for: NOCARDIACX, STOOLCX  No results found for: THROATCX, UNSTIMCULT, URINECX, CULTURE, VZVCULTUR  No results found for: VIRALCULTU, WOUNDCX    [x]  Radiology  XR Chest 1 View    Result Date: 4/19/2022  PROCEDURE: XR CHEST 1 VW  COMPARISON: [facility], CT, CHEST W/ CONTRAST, 10/04/2019, 15:41.  [facility], CR, CHEST AP/PA 1 VIEW, 10/04/2019, 14:24.  [facility], CR, CHEST AP/PA 1 VIEW, 8/20/2020, 6:58.  INDICATIONS: MID CHEST PAIN TODAY. PREVIOUS HEART ATTACK AND HEART STENT 2019.  FINDINGS:   The lungs are well-expanded. The heart and pulmonary vasculature are within normal limits. No pleural effusions are identified. There are no active appearing infiltrates.  IMPRESSION: No active disease.  MADHURI LEDEZMA MD       Electronically Signed and Approved By: MADHURI LEDEZMA MD on 4/19/2022 at 12:43               [x]  EKG/Telemetry    NSR   [x]  Cardiology/Vascular   []  Pathology  []  Old records  []  Other:      Assessment/Plan   Assessment / Plan     Assessment/Plan:   • Chest pain, rule out acute coronary syndrome  • Hypertension  • Hyperlipidemia  • Chronic ongoing tobacco abuse  • History of cardiac stent placement    PLAN   admit patient for observation  Continue with serial troponin  Continue with Lipitor at night  Continue with daily aspirin   Continue patient on losartan 100 mg daily  Continue patient on Norvasc 5 mg daily  Patient was previously supposed to be on metoprolol however is no longer taking.  Will defer to cardiology  Obtain echocardiogram since it has been quite sometime    Kobe his cardiologist will be consulted and can see patient in the morning  Patient's last cardiac catheterization was in August 2020 which showed borderline stenosis involving the right coronary artery 60 to 70%.  Stable diffuse disease involving obtuse marginal, severe in nature but not amendable to revascularization.  Severe disease involving a small diagonal branch not amendable to revascularization.  Moderate disease involving the circumflex artery in the mid and distal portion unchanged from the previous heart cath.  At that time it was recommended to continue medical management      DVT prophylaxis:  Lovenox   No DVT prophylaxis order currently exists.    CODE STATUS:    Code Status (Patient has no pulse and is not breathing): CPR (Attempt to Resuscitate)  Medical Interventions (Patient has pulse or is breathing): Full Support      Admission Status:  I believe this patient meets  Observation status.    Electronically signed by Pramod Garcia DO, 04/19/22, 3:28 PM EDT.             Electronically signed by Pramod Garcia DO at 04/19/22 1620          Emergency Department Notes      Samir Morales DO at 04/19/22 1423          Time: 2:23 PM EDT  Arrived by: Private vehicle  Chief Complaint: Chest pain  History provided by: Patient  History is limited by: N/A     History of Present Illness:  Patient is a 36 y.o.  male that presents to the emergency department with chest pain.  Patient reports chest pain began this morning around 10:30 AM while he was at work.  Patient states there was no strenuous activity in the time.  Patient reports the pain is a heaviness and burning kind of sensation.  Patient states it was located in the midsternal region.  He states the pain feels similar to what he experienced several years ago he had a cardiac cath and stent placed.  Patient reports he left work and on his way home it went away and he almost returned back to work however he already had a chance to return to work pain came  "back.  He went home to get his wife including nitro as well as 4 aspirin and tenderness later the pain was gone.  Because of this history presents to the ER for evaluation.  Patient also states that when he had his chest pain it took his breath away from him.    HPI    Patient Care Team  Primary Care Provider: Blanco Angelo MD    Past Medical History:     No Known Allergies  No past medical history on file.  No past surgical history on file.  No family history on file.    Home Medications:  Prior to Admission medications    Not on File        Social History:        Review of Systems:  Review of Systems   Constitutional: Negative for chills and fever.   HENT: Negative for congestion, ear pain and sore throat.    Eyes: Negative for pain.   Respiratory: Negative for cough, chest tightness and shortness of breath.    Cardiovascular: Positive for chest pain.   Gastrointestinal: Negative for abdominal pain, diarrhea, nausea and vomiting.   Genitourinary: Negative for flank pain and hematuria.   Musculoskeletal: Negative for joint swelling.   Skin: Negative for pallor.   Neurological: Negative for seizures and headaches.   All other systems reviewed and are negative.         Physical Exam:  BP (!) 166/101   Pulse 79   Temp 100.5 °F (38.1 °C) (Oral)   Resp 18   Ht 170.2 cm (67\")   Wt 119 kg (262 lb 9.1 oz)   SpO2 98%   BMI 41.12 kg/m²     Physical Exam Vital signs were reviewed under triage note.  General appearance - Patient appears well-developed and well-nourished.  Patient is in no acute distress.  Head - Normocephalic, atraumatic.  Pupils - Equal, round, reactive to light.  Extraocular muscles are intact.  Conjunctive is clear.  Nasal - Normal inspection.  No evidence of trauma or epistaxis.  Tympanic membranes - Gray, intact without erythema or retractions.  Oral mucosa - Pink and moist without lesions or erythema.  Uvula is midline.  Chest wall - Atraumatic.  Chest wall is nontender.  There is no vesicular " rashes noted.  Neck - Supple.  Trachea was midline.  There is no palpable lymphadenopathy or thyromegaly.  There are no meningeal signs  Lungs - Clear to auscultation and percussion bilaterally.  Heart - Regular rate and rhythm without any murmurs, clicks, or gallops.  Abdomen - Soft.  Bowel sounds are present.  There is no palpable tenderness.  There is no rebound, guarding, or rigidity.  There are no palpable masses.  There are no pulsatile masses.  Back - Spine is straight and midline.  There is no CVA tenderness.  Extremities - Intact x4 with full range of motion.  There is no palpable edema.  Pulses are intact x4 and equal.  Neurologic - Patient is awake, alert, and oriented x3.  Cranial nerves II through XII are grossly intact.  Motor and sensory functions grossly intact.  Cerebellar function was normal.  Integument - There are no rashes.  There are no petechia or purpura lesions noted.  There are no vesicular lesions noted.            Medications in the Emergency Department:  Medications   sodium chloride 0.9 % flush 10 mL (has no administration in time range)   aspirin chewable tablet 324 mg (has no administration in time range)        Labs  Lab Results (last 24 hours)     Procedure Component Value Units Date/Time    POC Troponin I with Hold Tube [[id number]] Collected: 04/19/22 1228    Specimen: Blood Updated: 04/19/22 1234    Narrative:      The following orders were created for panel order POC Troponin I with Hold Tube.  Procedure                               Abnormality         Status                     ---------                               -----------         ------                     POC Troponin I[[id number]]                                                              HOLD Troponin-I Tube[[id number]]                             Final result                 Please view results for these tests on the individual orders.    CBC & Differential [[id number]]  (Normal) Collected: 04/19/22 1228    Specimen:  Blood from Arm, Right Updated: 04/19/22 1237    Narrative:      The following orders were created for panel order CBC & Differential.  Procedure                               Abnormality         Status                     ---------                               -----------         ------                     CBC Auto Differential[[id number]]        Normal              Final result                 Please view results for these tests on the individual orders.    Comprehensive Metabolic Panel [[id number]]  (Abnormal) Collected: 04/19/22 1228    Specimen: Blood from Arm, Right Updated: 04/19/22 1304     Glucose 130 mg/dL      BUN 10 mg/dL      Creatinine 0.88 mg/dL      Sodium 140 mmol/L      Potassium 3.4 mmol/L      Chloride 104 mmol/L      CO2 24.1 mmol/L      Calcium 8.8 mg/dL      Total Protein 7.1 g/dL      Albumin 4.50 g/dL      ALT (SGPT) 31 U/L      AST (SGOT) 16 U/L      Alkaline Phosphatase 130 U/L      Total Bilirubin 0.3 mg/dL      Globulin 2.6 gm/dL      A/G Ratio 1.7 g/dL      BUN/Creatinine Ratio 11.4     Anion Gap 11.9 mmol/L      eGFR 115.7 mL/min/1.73      Comment: National Kidney Foundation and American Society of Nephrology (ASN) Task Force recommended calculation based on the Chronic Kidney Disease Epidemiology Collaboration (CKD-EPI) equation refit without adjustment for race.       Narrative:      GFR Normal >60  Chronic Kidney Disease <60  Kidney Failure <15      Lipase [[id number]]  (Normal) Collected: 04/19/22 1228    Specimen: Blood from Arm, Right Updated: 04/19/22 1304     Lipase 35 U/L     BNP [[id number]]  (Normal) Collected: 04/19/22 1228    Specimen: Blood from Arm, Right Updated: 04/19/22 1257     proBNP 28.7 pg/mL     Narrative:      Among patients with dyspnea, NT-proBNP is highly sensitive for the detection of acute congestive heart failure. In addition NT-proBNP of <300 pg/ml effectively rules out acute congestive heart failure with 99% negative predictive value.    Results may be  falsely decreased if patient taking Biotin.      Magnesium [[id number]]  (Normal) Collected: 04/19/22 1228    Specimen: Blood from Arm, Right Updated: 04/19/22 1304     Magnesium 2.1 mg/dL     CK Total & CKMB [[id number]]  (Normal) Collected: 04/19/22 1228    Specimen: Blood from Arm, Right Updated: 04/19/22 1304     CKMB 1.37 ng/mL      Creatine Kinase 108 U/L     Narrative:      CKMB results may be falsely decreased if patient taking Biotin.    CBC Auto Differential [[id number]]  (Normal) Collected: 04/19/22 1228    Specimen: Blood from Arm, Right Updated: 04/19/22 1237     WBC 9.44 10*3/mm3      RBC 5.07 10*6/mm3      Hemoglobin 15.1 g/dL      Hematocrit 43.2 %      MCV 85.2 fL      MCH 29.8 pg      MCHC 35.0 g/dL      RDW 13.2 %      RDW-SD 41.1 fl      MPV 9.5 fL      Platelets 275 10*3/mm3      Neutrophil % 66.5 %      Lymphocyte % 23.9 %      Monocyte % 6.7 %      Eosinophil % 2.0 %      Basophil % 0.6 %      Immature Grans % 0.3 %      Neutrophils, Absolute 6.27 10*3/mm3      Lymphocytes, Absolute 2.26 10*3/mm3      Monocytes, Absolute 0.63 10*3/mm3      Eosinophils, Absolute 0.19 10*3/mm3      Basophils, Absolute 0.06 10*3/mm3      Immature Grans, Absolute 0.03 10*3/mm3      nRBC 0.0 /100 WBC     POC Troponin I [[id number]]  (Normal) Collected: 04/19/22 1231    Specimen: Blood Updated: 04/19/22 1244     Troponin I 0.01 ng/mL      Comment: Serial Number: [id number]:  [id number]              Imaging:  XR Chest 1 View    Result Date: 4/19/2022  PROCEDURE: XR CHEST 1 VW  COMPARISON: [facility], CT, CHEST W/ CONTRAST, 10/04/2019, 15:41.  [facility], CR, CHEST AP/PA 1 VIEW, 10/04/2019, 14:24.  [facility], CR, CHEST AP/PA 1 VIEW, 8/20/2020, 6:58.  INDICATIONS: MID CHEST PAIN TODAY. PREVIOUS HEART ATTACK AND HEART STENT 2019.  FINDINGS:   The lungs are well-expanded. The heart and pulmonary vasculature are within normal limits. No pleural effusions are identified. There are no  active appearing infiltrates.  IMPRESSION: No active disease.  MADHURI LEDEZMA MD       Electronically Signed and Approved By: MADHURI LEDEZMA MD on 4/19/2022 at 12:43                EKG:  EKG performed at 1220 was read by me to show a normal sinus rhythm with ventricular rate 75 beats minute.  The intervals are 33 ms.  P waves are normal.  QRS intervals normal.  Axis is a 22 degrees.  There were some inverted T waves in lead V5 and V6 as well as leads I and aVL.  QT corrected was 449 ms.  This EKG was compared with the prior dated 8/21/2020 and there is no significant EKG changes.    Procedures:  Procedures    Progress                      The patient was seen and evaluated in the ED by me.  The above history and physical examination was performed as documented.  Diagnostic data was obtained.  Results reviewed.  Patient is pain-free during his ED course.  Patient case was discussed with Dr. Bullock.  He agreed to the need for admission.  Hospital service has been consulted for primary admission.  Patient will be made n.p.o. after midnight.  Patient is agreeable to this plan as well.      Medical Decision Making:  [facility]     Final diagnoses:   Acute coronary syndrome (HCC)        Disposition:  ED Disposition     ED Disposition   Decision to Admit    Condition   --    Comment   --              Samir Morales DO  04/20/22 1052      Electronically signed by Samir Morales DO at 04/20/22 1052     Jackie Murrell PCT at 04/19/22 1509        REPEAT EKG (1421) NOT NEEDED PER DR. FAM HOYOS. MICHELLE     Electronically signed by Jackie Murrell PCT at 04/19/22 1510       Alycia Rangel, RN    Registered Nurse      Plan of Care       Signed   Date of Service:  04/20/22 0524   Creation Time:  04/20/22 0524              Signed              Show:Clear all  []Manual[x]Template[]Copied    Added by:  [x]Alycia Rangel, RN      []Alicia for details    Goal Outcome Evaluation:  Progress: no change  Outcome Evaluation: No chest pain throughout  shift. Spouse at bedside, patient rested well throughout shift. VSS. NPO after midnight for possible heart cath this AM.                  TABITHA Bullock MD    Physician   Cardiology   Consults       Signed   Date of Service:  04/20/22 0820   Creation Time:  04/20/22 0820              Signed        Expand All Collapse All        Show:Clear all  [x]Manual[x]Template[]Copied    Added by:  [x]TABITHA Bullock MD      []Hover for details    Cardiology Consult Note  [facility] CARE UNIT 2              Patient Identification:  Deo Pacheco                                                               [id number]  36 y.o.                                                               male  1985     Date of Consultation: April 20     Reason for Consultation: Chest pain     PCP: Blanco Angelo MD  Primary cardiologist: Kobe     History of Present Illness:     36-year-old white male.  He has known coronary artery disease.  He has had previous PTCA in 2019, this was to a 100% diagonal branch occlusion.  There was branch vessel disease in the circumflex marginals as well.  He had another catheterization approximately 1 year later with FFR negative RCA, stable diffuse disease in the marginal branches not amenable to PCI, severe diffuse disease in the diagonal branch.  This was managed medically.     The patient presented to the emergency room with approximately 1 hour duration of chest pressure, with numbness in the medial portion of the left forearm and hand.  It has resolved with nitroglycerin.  His EKG showed no acute changes and biomarkers negative.  He has been comfortable overnight.  He continues to smoke.  He reports he is compliant with his medications.     Past History:  Medical History        Past Medical History:   Diagnosis Date   • Coronary artery disease     • Epilepsy with status epilepticus (HCC) 2015   • Hypertension           Surgical History         Past Surgical History:    Procedure Laterality Date   • CARDIAC CATHETERIZATION       • CORONARY ANGIOPLASTY WITH STENT PLACEMENT Right 2019         No Known Allergies  Social History   Social History            Socioeconomic History   • Marital status: Significant Other   Tobacco Use   • Smoking status: Current Every Day Smoker       Packs/day: 0.50       Years: 15.00       Pack years: 7.50       Types: Cigarettes       Start date: 2007   Vaping Use   • Vaping Use: Never used   Substance and Sexual Activity   • Alcohol use: Never   • Drug use: Never   • Sexual activity: Defer               Family History   Problem Relation Age of Onset   • Cancer Mother     • Diabetes Father     • Cancer Father        Medications:  Prescriptions Prior to Admission           Medications Prior to Admission   Medication Sig Dispense Refill Last Dose   • amLODIPine (NORVASC) 5 MG tablet Take 5 mg by mouth Daily.     4/18/2022 at Unknown time   • atorvastatin (LIPITOR) 40 MG tablet Take 40 mg by mouth Every Night.     4/18/2022 at Unknown time   • losartan (COZAAR) 100 MG tablet Take 100 mg by mouth Daily.     4/18/2022 at Unknown time         Current medications:  amLODIPine, 5 mg, Oral, Daily  aspirin, 324 mg, Oral, Once  atorvastatin, 40 mg, Oral, Nightly  enoxaparin, 40 mg, Subcutaneous, Q24H  losartan, 100 mg, Oral, Q24H  sodium chloride, 10 mL, Intravenous, Q12H        Current IV drips:     Review of Systems   Constitutional: Negative.   HENT: Negative.    Eyes: Negative.    Cardiovascular: Positive for chest pain and dyspnea on exertion.   Respiratory: Positive for shortness of breath.    Endocrine: Negative.    Hematologic/Lymphatic: Negative.    Skin: Negative.    Musculoskeletal: Negative.    Gastrointestinal: Negative.    Genitourinary: Negative.    Neurological: Negative.    Psychiatric/Behavioral: Negative.             Physical exam:     BP (!) 165/109 (BP Location: Left arm, Patient Position: Lying)   Pulse 79   Temp 99.1 °F (37.3 °C) (Oral)    "Resp 20   Ht 170.2 cm (67\")   Wt 117 kg (257 lb 8 oz)   SpO2 97%   BMI 40.33 kg/m²  Body mass index is 40.33 kg/m².   Oxygen saturation   @FLOWAN(10::1)@ SpO2  Min: 94 %  Max: 98 %     General Appearance:   · well developed  · well nourished, obese  HENT:   · oropharynx moist  · lips not cyanotic  Neck:  · thyroid not enlarged  · supple  Respiratory:  · no respiratory distress  · normal breath sounds  · no rales  Cardiovascular:  · no jugular venous distention  · regular rhythm  · apical impulse normal  · S1 normal, S2 normal  · no S3, no S4   · no murmur  · no rub, no thrill  · carotid pulses normal; no bruit  · pedal pulses normal  · lower extremity edema: none    Gastrointestinal:   · bowel sounds normal  · non-tender  · no hepatomegaly, no splenomegaly  Musculoskeletal:  · no clubbing of fingers.   · normocephalic, head atraumatic  Skin:   · warm, dry  Neuro/Psychiatric:  · judgement and insight appropriate  · normal mood and affect     Cardiographics:                 ECG  (personally reviewed) sinus rhythm, nonspecific inferolateral ST abnormality              Telemetry:  (personally reviewed)               ECHO: Pending              CATH: Reviewed from 2019, 2020              CARDIOLITE:       Lab Review:                        Results from last 7 days   Lab Units 04/19/22  1228   WBC 10*3/mm3 9.44   HEMOGLOBIN g/dL 15.1   HEMATOCRIT % 43.2                                                                   Results from last 7 days   Lab Units 04/19/22  1228   SODIUM mmol/L 140   BUN mg/dL 10   CREATININE mg/dL 0.88   GLUCOSE mg/dL 130*                  Estimated Creatinine Clearance: 144.7 mL/min (by C-G formula based on SCr of 0.88 mg/dL).                     Invalid input(s): LDLCALC                              No results found for: TSH              No results found for: HGBA1C                              No results found for: DIGOXIN              No components found for: DDIMERQUAN              "   Imaging:              [unfilled]     Assessment:       Chest pain    Coronary artery disease of bypass graft of native heart with stable angina pectoris (HCC)    Smoker        Initial cardiac assessment: 36-year-old white male with known coronary artery disease.  Presents with chest pain with some features of angina.  EKG shows nonspecific inferolateral ST changes.  Biomarkers negative.  BNP negative.        Recommendations:  1.  Stress imaging will be scheduled this morning to evaluate for significant ischemia burden.  2.  The patient currently is not interested in quitting smoking  3.  Continue aspirin, high potency statin, amlodipine/ARB, follow blood pressures  4.  If no high risk ischemia will adjust antianginal therapy and reestablish outpatient follow-up, the patient had not presented to the office in about a year and a half.  The patient is agreeable with this plan                      Quique Bullock MD  2022     08:20 EDT                      Pramod Garcia DO    Physician   Hospitalist   Discharge Summary       Signed   Date of Service:  22   Creation Time:  22              Signed        Expand All Collapse All        Show:Clear all  [x]Manual[x]Template[x]Copied    Added by:  [x]Pramod Garcia DO      []Alicia for details                                                                                      [facility]                                                                           HOSPITALIST  DISCHARGE SUMMARY     Patient Name: Deo Pacheco  : 1985  MRN: [id number]     Date of Admission: 2022  Date of Discharge:  2022     Primary Care Physician: Blanco Angelo MD             Consults      Date and Time Order Name Status Description     2022  5:28 PM Inpatient Cardiology Consult         2022  2:13 PM Cardiology (on-call MD unless specified)         2022  2:13 PM Hospitalist (on-call MD unless specified)                  Active and Resolved Hospital Problems:       Active Hospital Problems     Diagnosis POA   • Coronary artery disease of bypass graft of native heart with stable angina pectoris (HCC) [I25.708] Unknown   • Smoker [F17.200] Unknown   • Chest pain [R07.9] Yes       Resolved Hospital Problems   No resolved problems to display.         Hospital Course      Hospital Course:  · Deo Pacheco is a 36 y.o. male . male with history of coronary artery disease with stent placement in 2019 who presented to the ER with chest pain.  Patient states his chest pain started about 1030 this morning he states it was a combination of intermittent sharp pain as well as pressure pain.  It was on the left side of his chest.  He said he felt some numbness tingling down his arm and in his last 2 fingers.  He states that he took a nitroglycerin and his chest pain eventually resolved.  Patient was chest pain-free when I saw him in the ER however he stated he felt like his chest pain was coming back and it was more of a pressure pain.  Patient denies any nausea or vomiting but patient states he has not eaten most of the day.  Patient's work-up in the ER was unremarkable.  His vital signs were stable.  His EKG was fine.  His troponins were negative as well as his laboratory data however given his past history of cardiac disease and history of stent placement Hospital service was asked for observation with cardiology consult.  Patient was seen by cardiology.  Patient had an echocardiogram that showed normal EF.  Left ventricular wall thickness was consistent with borderline hypertrophy.  Diastolic function was normal.  Cardiology ordered a stress test which showed Left ventricular ejection fraction is normal. (Calculated EF = 50%). Myocardial perfusion imaging indicates a small-sized, mildly severe area of ischemia located in the apical lateral segment.Impressions are consistent with an intermediate risk study. Findings consistent  with a normal ECG stress test.  Patient has been started on Ranexa and Imdur and okay to discharge per cardiology.  Patient remains chest pain-free.  Patient be discharged home in stable condition.  Patient would follow-up with primary care doctor in the next week and also will need to follow-up with cardiology in the next 1 to 2 weeks.  If patient should have any further chest pain then he needs to return to the ER immediately.              Day of Discharge      Vital Signs:  Temp:  [98 °F (36.7 °C)-99.1 °F (37.3 °C)] 98.1 °F (36.7 °C)  Heart Rate:  [64-82] 82  Resp:  [19-20] 20  BP: (123-169)/() 147/79  Physical Exam:               Constitutional: Awake, alert, no acute distress resting in bed eating pizza and mountain dew with wife at bedside               Eyes: Pupils equal, sclerae anicteric, no conjunctival injection              HENT: NCAT, mucous membranes moist              Neck: Supple              Respiratory: Clear to auscultation bilaterally, nonlabored respirations no wheezing, rales or rhonchi noted               Cardiovascular: RRR, no murmurs,              Gastrointestinal: Positive bowel sounds, soft, nontender, nondistended              Musculoskeletal: No bilateral ankle edema, no clubbing or cyanosis to extremities              Psychiatric: Appropriate affect, cooperative              Neurologic: Oriented x 3, strength symmetric in all extremities, Cranial Nerves grossly intact to confrontation, speech clear              Skin: No rashes         Discharge Details               Discharge Medications            New Medications      Instructions Start Date   aspirin 81 MG EC tablet    81 mg, Oral, Daily        isosorbide mononitrate 30 MG 24 hr tablet  Commonly known as: IMDUR    30 mg, Oral, Every 24 Hours Scheduled        nitroglycerin 0.4 MG SL tablet  Commonly known as: Nitrostat    0.4 mg, Sublingual, Every 5 Minutes PRN, Take no more than 3 doses in 15 minutes.        ranolazine 500 MG  12 hr tablet  Commonly known as: RANEXA    500 mg, Oral, Every 12 Hours Scheduled                      Continue These Medications      Instructions Start Date   amLODIPine 5 MG tablet  Commonly known as: NORVASC    5 mg, Oral, Daily        atorvastatin 40 MG tablet  Commonly known as: LIPITOR    40 mg, Oral, Nightly        losartan 100 MG tablet  Commonly known as: COZAAR    100 mg, Oral, Daily                  No Known Allergies     Discharge Disposition:  Home or Self Care     Diet:  Hospital:      Diet Order   Procedures   • Diet Regular; Cardiac         Discharge Activity:  As tolerated      CODE STATUS:      Code Status and Medical Interventions:   Ordered at: 04/19/22 1507     Code Status (Patient has no pulse and is not breathing):     CPR (Attempt to Resuscitate)     Medical Interventions (Patient has pulse or is breathing):     Full Support            No future appointments.          Additional Instructions for the Follow-ups that You Need to Schedule      Discharge Follow-up with PCP   As directed         Currently Documented PCP:    Blanco Angelo MD    PCP Phone Number:    [phone]      Follow Up Details: in 1 week           Discharge Follow-up with Specified Provider: Dr. Bullock; 2 Weeks   As directed        To: Dr. Bullock    Follow Up: 2 Weeks                   Pertinent  and/or Most Recent Results      PROCEDURES:   Stress test         LAB RESULTS:          Lab 04/19/22  1228   WBC 9.44   HEMOGLOBIN 15.1   HEMATOCRIT 43.2   PLATELETS 275   NEUTROS ABS 6.27   IMMATURE GRANS (ABS) 0.03   LYMPHS ABS 2.26   MONOS ABS 0.63   EOS ABS 0.19   MCV 85.2                Lab 04/20/22  0939 04/20/22  0528 04/19/22  1228   SODIUM 139  --  140   POTASSIUM 3.5  --  3.4*   CHLORIDE 104  --  104   CO2 23.1  --  24.1   ANION GAP 11.9  --  11.9   BUN 10  --  10   CREATININE 0.88  --  0.88   EGFR 115.7  --  115.7   GLUCOSE 130*  --  130*   CALCIUM 8.7  --  8.8   MAGNESIUM  --  2.1 2.1              Lab 04/19/22  1228    TOTAL PROTEIN 7.1   ALBUMIN 4.50   GLOBULIN 2.6   ALT (SGPT) 31   AST (SGOT) 16   BILIRUBIN 0.3   ALK PHOS 130*   LIPASE 35              Lab 04/19/22  1228   PROBNP 28.7   TROPONIN T <0.010                      Brief Urine Lab Results      None              Microbiology Results (last 10 days)      ** No results found for the last 240 hours. **             Last 30 day radiology impressions                   Results for orders placed during the hospital encounter of 04/19/22     Adult Transthoracic Echo Complete With Contrast if Necessary Per Protocol     Interpretation Summary  · Left ventricular wall thickness is consistent with borderline concentric hypertrophy.  · Left ventricular ejection fraction appears to be 61 - 65%.  · Left ventricular diastolic function was normal.  · No evidence of significant valvular disease        Labs Pending at Discharge:       Pending Labs      Order Current Status     POC Troponin I with Hold Tube In process                   Electronically signed by Pramod Garcia DO, 04/20/22, 4:09 PM EDT.                     Routing History

## 2022-04-21 NOTE — OUTREACH NOTE
Prep Survey    Flowsheet Row Responses   [facility] facility patient discharged from? [facility]   Is LACE score < 7 ? Yes   Emergency Room discharge w/ pulse ox? No   Eligibility Not Eligible   What are the reasons patient is not eligible? Other  [Low risk for readmission]   Does the patient have one of the following disease processes/diagnoses(primary or secondary)? Other   Prep survey completed? Yes          SABA LU - Registered Nurse

## 2022-06-03 ENCOUNTER — OFFICE VISIT (OUTPATIENT)
Dept: CARDIOLOGY | Facility: CLINIC | Age: 37
End: 2022-06-03

## 2022-06-03 VITALS
SYSTOLIC BLOOD PRESSURE: 186 MMHG | DIASTOLIC BLOOD PRESSURE: 115 MMHG | HEIGHT: 67 IN | WEIGHT: 246 LBS | BODY MASS INDEX: 38.61 KG/M2 | HEART RATE: 80 BPM

## 2022-06-03 DIAGNOSIS — F17.200 SMOKER: ICD-10-CM

## 2022-06-03 DIAGNOSIS — I25.708 CORONARY ARTERY DISEASE OF BYPASS GRAFT OF NATIVE HEART WITH STABLE ANGINA PECTORIS: Primary | ICD-10-CM

## 2022-06-03 DIAGNOSIS — I10 HYPERTENSION, ESSENTIAL: ICD-10-CM

## 2022-06-03 DIAGNOSIS — E78.2 HYPERLIPEMIA, MIXED: ICD-10-CM

## 2022-06-03 PROCEDURE — 99214 OFFICE O/P EST MOD 30 MIN: CPT | Performed by: INTERNAL MEDICINE

## 2022-06-03 NOTE — PROGRESS NOTES
Chief Complaint  Coronary Artery Disease    Subjective            Deomichael Pacheco presents to [facility] CARDIOLOGY  History of Present Illness    Mr. Pacheco is here for follow-up evaluation management of coronary artery disease, he has had previous PCI I believe of a large diagonal, he had branch vessel disease in the circumflex marginals which was managed medically and has had a catheterization with negative FFR of the RCA.  He was recently hospitalized with chest pain.  Stress imaging showed small apical lateral ischemia which was managed medically.  Since discharge he is feeling well but has not taken any medication over the past 2 weeks due to financial issues.  He continues to smoke.    PMH  Past Medical History:   Diagnosis Date   • Coronary artery disease    • Epilepsy with status epilepticus (HCC) 2015   • Hypertension          SURGICALHX  Past Surgical History:   Procedure Laterality Date   • CARDIAC CATHETERIZATION     • CORONARY ANGIOPLASTY WITH STENT PLACEMENT Right 2019        SOC  Social History     Socioeconomic History   • Marital status: Significant Other   Tobacco Use   • Smoking status: Current Every Day Smoker     Packs/day: 0.50     Years: 15.00     Pack years: 7.50     Types: Cigarettes     Start date: 2007   Vaping Use   • Vaping Use: Never used   Substance and Sexual Activity   • Alcohol use: Never   • Drug use: Never   • Sexual activity: Defer         FAMHX  Family History   Problem Relation Age of Onset   • Cancer Mother    • Diabetes Father    • Cancer Father           ALLERGY  No Known Allergies     MEDSCURRENT    Current Outpatient Medications:   •  amLODIPine (NORVASC) 5 MG tablet, Take 5 mg by mouth Daily., Disp: , Rfl:   •  aspirin (aspirin) 81 MG EC tablet, Take 1 tablet by mouth Daily., Disp: 90 tablet, Rfl: 33  •  atorvastatin (LIPITOR) 40 MG tablet, Take 40 mg by mouth Every Night., Disp: , Rfl:   •  isosorbide mononitrate (IMDUR) 30 MG 24 hr tablet,  "Take 1 tablet by mouth Daily., Disp: 90 tablet, Rfl: 1  •  losartan (COZAAR) 100 MG tablet, Take 100 mg by mouth Daily., Disp: , Rfl:   •  nitroglycerin (Nitrostat) 0.4 MG SL tablet, Place 1 tablet under the tongue Every 5 (Five) Minutes As Needed for Chest Pain. Take no more than 3 doses in 15 minutes., Disp: 25 tablet, Rfl: 12  •  ranolazine (RANEXA) 500 MG 12 hr tablet, Take 1 tablet by mouth Every 12 (Twelve) Hours., Disp: 180 tablet, Rfl: 1      Review of Systems   Cardiovascular: Negative for chest pain and dyspnea on exertion.   Respiratory: Negative for shortness of breath.         Objective     BP (!) 186/115 (BP Location: Right arm)   Pulse 80   Ht 170.2 cm (67\")   Wt 112 kg (246 lb)   BMI 38.53 kg/m²       General Appearance:   · well developed  · well nourished  HENT:   · oropharynx moist  · lips not cyanotic  Neck:  · thyroid not enlarged  · supple  Respiratory:  · no respiratory distress  · normal breath sounds  · no rales  Cardiovascular:  · no jugular venous distention  · regular rhythm  · apical impulse normal  · S1 normal, S2 normal  · no S3, no S4   · no murmur  · no rub, no thrill  · carotid pulses normal; no bruit  · pedal pulses normal  · lower extremity edema: none    Musculoskeletal:  · no clubbing of fingers.   · normocephalic, head atraumatic  Skin:   · warm, dry  Psychiatric:  · judgement and insight appropriate  · normal mood and affect      Result Review :     The following data was reviewed by: Quique Bullock MD on 06/03/2022:    CMP    CMP 4/19/22 4/20/22   Glucose 130 (A) 130 (A)   BUN 10 10   Creatinine 0.88 0.88   Sodium 140 139   Potassium 3.4 (A) 3.5   Chloride 104 104   Calcium 8.8 8.7   Albumin 4.50    Total Bilirubin 0.3    Alkaline Phosphatase 130 (A)    AST (SGOT) 16    ALT (SGPT) 31    (A) Abnormal value            CBC    CBC 4/19/22   WBC 9.44   RBC 5.07   Hemoglobin 15.1   Hematocrit 43.2   MCV 85.2   MCH 29.8   MCHC 35.0   RDW 13.2   Platelets 275       "               Data reviewed: Recent stress imaging reviewed with the patient     Procedures      Deo Pacheco  reports that he has been smoking cigarettes. He started smoking about 15 years ago. He has a 7.50 pack-year smoking history. He does not have any smokeless tobacco history on file.. I have educated him on the risk of diseases from using tobacco products such as cancer, COPD and heart disease.     I advised him to quit and he is willing to quit. We have discussed the following method/s for tobacco cessation:  Counseling.  Together we have set a quit date for When he weans down to 0 cigarettes.  He will follow up with me in several months or sooner to check on his progress.    I spent 3  minutes counseling the patient.                Assessment and Plan        ASSESSMENT:  Encounter Diagnoses   Name Primary?   • Coronary artery disease of bypass graft of native heart with stable angina pectoris (HCC) Yes   • Hypertension, essential    • Hyperlipemia, mixed    • Smoker          PLAN:    1.  Coronary artery disease, clinically stable, recent stress imaging showed small apical lateral ischemia.  Relatively recent cardiac catheterization with known coronary anatomy.  We will continue a medical therapy approach.  He has been off of his medication recently due to financial issues.  He is going to get them filled today.  2.  Essential hypertension uncontrolled due to medication noncompliance, he is resuming that today.  He will check his blood pressure at home and call our office to let me know the rolling average over the next 2 to 3 weeks.  3.  Mixed hyperlipidemia, resume statin therapy  4.  Smoking cessation counseling    Routine follow-up will be arranged as well          Patient was given instructions and counseling regarding his condition or for health maintenance advice. Please see specific information pulled into the AVS if appropriate.             TABITHA Bullock MD  6/3/2022    12:13 EDT

## 2022-12-14 ENCOUNTER — OFFICE VISIT (OUTPATIENT)
Dept: CARDIOLOGY | Facility: CLINIC | Age: 37
End: 2022-12-14

## 2022-12-14 VITALS
HEART RATE: 85 BPM | BODY MASS INDEX: 40.34 KG/M2 | DIASTOLIC BLOOD PRESSURE: 82 MMHG | HEIGHT: 67 IN | SYSTOLIC BLOOD PRESSURE: 136 MMHG | WEIGHT: 257 LBS

## 2022-12-14 DIAGNOSIS — F17.200 SMOKER: ICD-10-CM

## 2022-12-14 DIAGNOSIS — E78.2 HYPERLIPEMIA, MIXED: ICD-10-CM

## 2022-12-14 DIAGNOSIS — I25.708 CORONARY ARTERY DISEASE OF BYPASS GRAFT OF NATIVE HEART WITH STABLE ANGINA PECTORIS: Primary | ICD-10-CM

## 2022-12-14 DIAGNOSIS — I10 HYPERTENSION, ESSENTIAL: ICD-10-CM

## 2022-12-14 PROCEDURE — 99214 OFFICE O/P EST MOD 30 MIN: CPT | Performed by: INTERNAL MEDICINE

## 2022-12-14 NOTE — PROGRESS NOTES
Chief Complaint  Coronary Artery Disease, Hypertension, and Hyperlipidemia    Subjective            Deo Pacheco presents to [facility] CARDIOLOGY    Mr. Pacheco is here for follow-up evaluation management of coronary artery disease, he has had previous PCI I believe of a large diagonal, he had branch vessel disease in the circumflex marginals which was managed medically and has had a catheterization with negative FFR of the RCA.  Since the last visit he is doing well.  He is watching what he is eating, he is trying to exercise.  He unfortunately continues to smoke.  He is compliant with his medical therapy.    PMH  Past Medical History:   Diagnosis Date   • Coronary artery disease    • Epilepsy with status epilepticus (HCC) 2015   • Hypertension          SURGICALHX  Past Surgical History:   Procedure Laterality Date   • CARDIAC CATHETERIZATION     • CORONARY ANGIOPLASTY WITH STENT PLACEMENT Right 2019        SOC  Social History     Socioeconomic History   • Marital status: Significant Other   Tobacco Use   • Smoking status: Every Day     Packs/day: 0.50     Years: 15.00     Pack years: 7.50     Types: Cigarettes     Start date: 2007   • Smokeless tobacco: Never   Vaping Use   • Vaping Use: Never used   Substance and Sexual Activity   • Alcohol use: Never   • Drug use: Never   • Sexual activity: Defer         FAMHX  Family History   Problem Relation Age of Onset   • Cancer Mother    • Diabetes Father    • Cancer Father           ALLERGY  No Known Allergies     MEDSCURRENT    Current Outpatient Medications:   •  acetaminophen (TYLENOL) 500 MG tablet, Take 1 tablet by mouth Every 6 (Six) Hours As Needed for Mild Pain., Disp: 30 tablet, Rfl: 0  •  amLODIPine (NORVASC) 5 MG tablet, Take 5 mg by mouth Daily., Disp: , Rfl:   •  aspirin (aspirin) 81 MG EC tablet, Take 1 tablet by mouth Daily., Disp: 90 tablet, Rfl: 33  •  atorvastatin (LIPITOR) 40 MG tablet, Take 40 mg by mouth Every Night.,  "Disp: , Rfl:   •  diclofenac (VOLTAREN) 75 MG EC tablet, Take 1 tablet by mouth 2 (Two) Times a Day., Disp: 30 tablet, Rfl: 0  •  isosorbide mononitrate (IMDUR) 30 MG 24 hr tablet, Take 1 tablet by mouth Daily., Disp: 90 tablet, Rfl: 1  •  losartan (COZAAR) 100 MG tablet, Take 100 mg by mouth Daily., Disp: , Rfl:   •  nitroglycerin (Nitrostat) 0.4 MG SL tablet, Place 1 tablet under the tongue Every 5 (Five) Minutes As Needed for Chest Pain. Take no more than 3 doses in 15 minutes., Disp: 25 tablet, Rfl: 12  •  ranolazine (RANEXA) 500 MG 12 hr tablet, Take 1 tablet by mouth Every 12 (Twelve) Hours., Disp: 180 tablet, Rfl: 1  •  predniSONE (DELTASONE) 20 MG tablet, Take 2 tablets by mouth Daily., Disp: 10 tablet, Rfl: 0      Review of Systems   Cardiovascular: Negative for chest pain and dyspnea on exertion.   Respiratory: Negative for shortness of breath.         Objective     /82   Pulse 85   Ht 170.2 cm (67\")   Wt 117 kg (257 lb)   BMI 40.25 kg/m²       General Appearance:   · well developed  · well nourished  HENT:   · oropharynx moist  · lips not cyanotic  Neck:  · thyroid not enlarged  · supple  Respiratory:  · no respiratory distress  · normal breath sounds  · no rales  Cardiovascular:  · no jugular venous distention  · regular rhythm  · apical impulse normal  · S1 normal, S2 normal  · no S3, no S4   · no murmur  · no rub, no thrill  · carotid pulses normal; no bruit  · pedal pulses normal  · lower extremity edema: none    Musculoskeletal:  · no clubbing of fingers.   · normocephalic, head atraumatic  Skin:   · warm, dry  Psychiatric:  · judgement and insight appropriate  · normal mood and affect      Result Review :     The following data was reviewed by: Quique Bullock MD on 06/03/2022:    CMP    CMP 4/19/22 4/20/22   Glucose 130 (A) 130 (A)   BUN 10 10   Creatinine 0.88 0.88   Sodium 140 139   Potassium 3.4 (A) 3.5   Chloride 104 104   Calcium 8.8 8.7   Albumin 4.50    Total Bilirubin " 0.3    Alkaline Phosphatase 130 (A)    AST (SGOT) 16    ALT (SGPT) 31    (A) Abnormal value            CBC    CBC 4/19/22   WBC 9.44   RBC 5.07   Hemoglobin 15.1   Hematocrit 43.2   MCV 85.2   MCH 29.8   MCHC 35.0   RDW 13.2   Platelets 275                     Laboratory studies pending review from primary care    Procedures      Deo Pacheco  reports that he has been smoking cigarettes. He started smoking about 15 years ago. He has a 7.50 pack-year smoking history. He has never used smokeless tobacco.. I have educated him on the risk of diseases from using tobacco products such as cancer, COPD and heart disease.     I advised him to quit and he is willing to quit. We have discussed the following method/s for tobacco cessation:  Counseling.  Together we have set a quit date for When he weans down to 0 cigarettes.  He will follow up with me in several months or sooner to check on his progress.    I spent 3  minutes counseling the patient.                Assessment and Plan        ASSESSMENT:  Encounter Diagnoses   Name Primary?   • Coronary artery disease of bypass graft of native heart with stable angina pectoris (HCC) Yes   • Hypertension, essential    • Hyperlipemia, mixed    • Smoker          PLAN:    1.  Coronary artery disease, clinically stable, continue secondary prevention medical therapy  2.  Essential hypertension -controlled, continue current medical therapy  3.  Mixed hyperlipidemia, continue statin therapy, stable, review labs from primary care  4.  Smoking cessation counseling    Follow-up annually          Patient was given instructions and counseling regarding his condition or for health maintenance advice. Please see specific information pulled into the AVS if appropriate.             TABITHA Bullock MD  12/14/2022    11:36 EST

## 2023-03-20 ENCOUNTER — OFFICE VISIT (OUTPATIENT)
Dept: ORTHOPEDIC SURGERY | Facility: CLINIC | Age: 38
End: 2023-03-20
Payer: COMMERCIAL

## 2023-03-20 VITALS — HEIGHT: 67 IN | WEIGHT: 247 LBS | BODY MASS INDEX: 38.77 KG/M2 | OXYGEN SATURATION: 94 % | HEART RATE: 100 BPM

## 2023-03-20 DIAGNOSIS — M25.562 LEFT KNEE PAIN, UNSPECIFIED CHRONICITY: ICD-10-CM

## 2023-03-20 DIAGNOSIS — S86.812A PATELLAR TENDON STRAIN, LEFT, INITIAL ENCOUNTER: Primary | ICD-10-CM

## 2023-03-20 PROCEDURE — 99203 OFFICE O/P NEW LOW 30 MIN: CPT | Performed by: ORTHOPAEDIC SURGERY

## 2023-03-20 RX ORDER — BUPROPION HYDROCHLORIDE 150 MG/1
TABLET, EXTENDED RELEASE ORAL
COMMUNITY
Start: 2023-03-01

## 2023-03-20 RX ORDER — DICLOFENAC SODIUM 75 MG/1
75 TABLET, DELAYED RELEASE ORAL 2 TIMES DAILY
Qty: 60 TABLET | Refills: 1 | Status: SHIPPED | OUTPATIENT
Start: 2023-03-20
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
## 2023-04-06 ENCOUNTER — TELEPHONE (OUTPATIENT)
Dept: ORTHOPEDIC SURGERY | Facility: CLINIC | Age: 38
End: 2023-04-06
Payer: COMMERCIAL

## 2023-04-06 DIAGNOSIS — S86.812A PATELLAR TENDON STRAIN, LEFT, INITIAL ENCOUNTER: Primary | ICD-10-CM

## 2023-04-06 DIAGNOSIS — M25.562 LEFT KNEE PAIN, UNSPECIFIED CHRONICITY: ICD-10-CM

## 2023-04-06 NOTE — TELEPHONE ENCOUNTER
PATIENT'S SIGNIFICANT OTHER CALLED AND STATED PATIENT'S KNEE PAIN HAS NOT HAD ANY RELIEF. HAVING MORE TINGLING. IN LAST OFFICE NOTE THEY DISCUSSED GETTING AN MRI. THEY ARE REQUESTING MRI ORDER.

## 2023-04-14 ENCOUNTER — OFFICE VISIT (OUTPATIENT)
Dept: ORTHOPEDIC SURGERY | Facility: CLINIC | Age: 38
End: 2023-04-14
Payer: COMMERCIAL

## 2023-04-14 VITALS — OXYGEN SATURATION: 95 % | HEIGHT: 67 IN | BODY MASS INDEX: 38.77 KG/M2 | WEIGHT: 247 LBS | HEART RATE: 102 BPM

## 2023-04-14 DIAGNOSIS — S89.92XD LEFT KNEE INJURY, SUBSEQUENT ENCOUNTER: Primary | ICD-10-CM

## 2023-04-14 NOTE — PROGRESS NOTES
"Chief Complaint  Follow-up and Pain of the Left Knee    Subjective      Deo Pacheco presents to [facility] for follow-up of left knee injury that occurred on 2/28/2023.  Patient was lifting a toolbox, the toolbox hit his stomach and he twisted his knee.  Patient states that the knee is not feeling any better and is buckling when he is walking.  He is experiencing a loud popping every time he bends his knee.  He presents today with a knee brace.  Patient had appointment with Dr. Nair on 3/20/2023 where we had x-rays done that were negative.  At that visit Dr. Nair recommended a anti-inflammatory and if the symptoms have not resolved in 3 to 4 weeks consider MRI.  Patient has an MRI scheduled for May 8 that was ordered by the urgent care.    Objective   No Known Allergies    Vital Signs:   Pulse 102   Ht 170.2 cm (67\")   Wt 112 kg (247 lb)   SpO2 95%   BMI 38.69 kg/m²       Physical Exam    Constitutional: Awake, alert. Well nourished appearance.    Integumentary: Warm, dry, intact. No obvious rashes.    HENT: Atraumatic, normocephalic.   Respiratory: Non labored respirations .   Cardiovascular: Intact peripheral pulses.    Psychiatric: Normal mood and affect. A&O X3    Ortho Exam  No skin abnormalities at this time, no bruising or erythema.  He is tender to palpation within the medial and lateral compartments.  No edema is noted.  Patient is stable to varus and valgus stress, laxity is noted on Lachman exam.  He can extend his knee to 0 degrees, flex to 110.  Imaging Results (Most Recent)     None                    Assessment and Plan   Problem List Items Addressed This Visit    None  Visit Diagnoses     Left knee injury, subsequent encounter    -  Primary          Follow Up   Return for Recheck.    Educated on risk of smoking. Discussed options for smoking cessation.     Educated on risk of elevated BMI. Encouraged to follow up with PCP for weight loss options.     Social " History     Socioeconomic History   • Marital status: Significant Other   Tobacco Use   • Smoking status: Every Day     Packs/day: 0.50     Years: 10.00     Pack years: 5.00     Types: Cigarettes     Start date: 1/1/2007   • Smokeless tobacco: Never   Vaping Use   • Vaping Use: Never used   Substance and Sexual Activity   • Alcohol use: Never   • Drug use: Never   • Sexual activity: Yes     Partners: Female     Birth control/protection: None       There are no Patient Instructions on file for this visit.  Patient was given instructions and counseling regarding his condition or for health maintenance advice. Please see specific information pulled into the AVS if appropriate.

## 2023-04-14 NOTE — PATIENT INSTRUCTIONS
Patient counseled regarding taking anti-inflammatories with blood thinners.  Patient is to keep his May 8 appointment for MRI and to follow-up afterwards.  He was educated of possible need for surgery.  We did offer a knee cortisone injection today which he declined.    Patient is to follow-up after his MRI, call for any questions or concerns.

## 2023-04-25 ENCOUNTER — TRANSCRIBE ORDERS (OUTPATIENT)
Dept: ADMINISTRATIVE | Facility: HOSPITAL | Age: 38
End: 2023-04-25
Payer: COMMERCIAL

## 2023-04-25 DIAGNOSIS — R51.9 INTRACTABLE HEADACHE, UNSPECIFIED CHRONICITY PATTERN, UNSPECIFIED HEADACHE TYPE: Primary | ICD-10-CM

## 2023-05-08 ENCOUNTER — HOSPITAL ENCOUNTER (OUTPATIENT)
Dept: MRI IMAGING | Facility: HOSPITAL | Age: 38
Discharge: HOME | End: 2023-05-08
Admitting: ORTHOPAEDIC SURGERY
Payer: COMMERCIAL

## 2023-05-08 DIAGNOSIS — S86.812A PATELLAR TENDON STRAIN, LEFT, INITIAL ENCOUNTER: ICD-10-CM

## 2023-05-08 DIAGNOSIS — M25.562 LEFT KNEE PAIN, UNSPECIFIED CHRONICITY: ICD-10-CM

## 2023-05-08 PROCEDURE — 73721 MRI JNT OF LWR EXTRE W/O DYE: CPT

## 2023-05-12 ENCOUNTER — OFFICE VISIT (OUTPATIENT)
Dept: ORTHOPEDIC SURGERY | Facility: CLINIC | Age: 38
End: 2023-05-12
Payer: COMMERCIAL

## 2023-05-12 VITALS — BODY MASS INDEX: 38.77 KG/M2 | WEIGHT: 247 LBS | HEIGHT: 67 IN

## 2023-05-12 DIAGNOSIS — S86.811A STRAIN OF CALF MUSCLE, RIGHT, INITIAL ENCOUNTER: ICD-10-CM

## 2023-05-12 DIAGNOSIS — S89.91XA INJURY OF RIGHT KNEE, INITIAL ENCOUNTER: ICD-10-CM

## 2023-05-12 DIAGNOSIS — M25.561 RIGHT KNEE PAIN, UNSPECIFIED CHRONICITY: Primary | ICD-10-CM
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
## 2023-05-15 ENCOUNTER — OFFICE VISIT (OUTPATIENT)
Dept: ORTHOPEDIC SURGERY | Facility: CLINIC | Age: 38
End: 2023-05-15
Payer: COMMERCIAL

## 2023-05-15 VITALS — HEART RATE: 82 BPM | OXYGEN SATURATION: 97 % | HEIGHT: 67 IN | WEIGHT: 247 LBS | BODY MASS INDEX: 38.77 KG/M2

## 2023-05-15 DIAGNOSIS — M25.562 ACUTE PAIN OF LEFT KNEE: Primary | ICD-10-CM

## 2023-05-15 RX ORDER — LIDOCAINE HYDROCHLORIDE 10 MG/ML
5 INJECTION, SOLUTION INFILTRATION; PERINEURAL
Status: COMPLETED | OUTPATIENT
Start: 2023-05-15 | End: 2023-05-15

## 2023-05-15 RX ORDER — NAPROXEN 500 MG/1
1 TABLET ORAL EVERY 12 HOURS SCHEDULED
COMMUNITY
Start: 2023-04-24

## 2023-05-15 RX ORDER — TRIAMCINOLONE ACETONIDE 40 MG/ML
40 INJECTION, SUSPENSION INTRA-ARTICULAR; INTRAMUSCULAR
Status: COMPLETED | OUTPATIENT
Start: 2023-05-15 | End: 2023-05-15

## 2023-05-15 RX ADMIN — TRIAMCINOLONE ACETONIDE 40 MG: 40 INJECTION, SUSPENSION INTRA-ARTICULAR; INTRAMUSCULAR at 08:26

## 2023-05-15 RX ADMIN — LIDOCAINE HYDROCHLORIDE 5 ML: 10 INJECTION, SOLUTION INFILTRATION; PERINEURAL at 08:26
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
## 2023-05-15 NOTE — PATIENT INSTRUCTIONS
Discussed with patient the options regarding his left knee to include anti-inflammatories, steroid injection, gel injection or arthroscopy.  Discussed that steroid injections can be given every 3 months for the management of pain and they have varying results with age patient.  Gel injections must be preapproved by insurance.  The arthroscopy would include a surgery in which small incisions are made to the knee and the torn meniscus is removed as well as arthritis cleaned up.  Recovery time varies between individuals.  Some patients at 2 weeks are released, other patients require physical therapy and are not released until 6 weeks.    Patient elects to try a steroid injection today in office.  He tolerated the procedure well without complications.    Recommend follow-up in 3 to 4 weeks to evaluate the effectiveness of the injection and to consider surgery at that time.  He is instructed that if he is doing well at this time and does not want to consider surgery he may call and cancel this appointment.

## 2023-06-05 ENCOUNTER — TELEPHONE (OUTPATIENT)
Dept: CARDIOLOGY | Facility: CLINIC | Age: 38
End: 2023-06-05
Payer: COMMERCIAL

## 2023-06-05 ENCOUNTER — TELEPHONE (OUTPATIENT)
Dept: ORTHOPEDIC SURGERY | Facility: CLINIC | Age: 38
End: 2023-06-05
Payer: COMMERCIAL

## 2023-06-05 ENCOUNTER — PREP FOR SURGERY (OUTPATIENT)
Dept: ORTHOPEDIC SURGERY | Facility: CLINIC | Age: 38
End: 2023-06-05

## 2023-06-05 ENCOUNTER — OFFICE VISIT (OUTPATIENT)
Dept: ORTHOPEDIC SURGERY | Facility: CLINIC | Age: 38
End: 2023-06-05
Payer: COMMERCIAL

## 2023-06-05 VITALS — BODY MASS INDEX: 38.77 KG/M2 | HEIGHT: 67 IN | HEART RATE: 85 BPM | OXYGEN SATURATION: 99 % | WEIGHT: 247 LBS

## 2023-06-05 DIAGNOSIS — Z01.818 PREOPERATIVE EXAMINATION: Primary | ICD-10-CM

## 2023-06-05 DIAGNOSIS — S83.282D TEAR OF LATERAL MENISCUS OF LEFT KNEE, CURRENT, UNSPECIFIED TEAR TYPE, SUBSEQUENT ENCOUNTER: Primary | ICD-10-CM

## 2023-06-05 NOTE — TELEPHONE ENCOUNTER
Procedure: L Knee Scope    Med Directive: Aspirin    PMH: HTN, HLD, CAD previous PCI    Last Seen: 12/14/22

## 2023-06-05 NOTE — PROGRESS NOTES
"Chief Complaint  Follow-up and Pain of the Left Knee    Subjective      Deo Pacheco presents to [facility] for follow-up of left knee pain.  He received a steroid injection on 5/15/2023 that he reports he feels approximately 35 to 40% better.  Reports the pain of 6/10 with walking.  Had an MRI on 5/8/2023 showing an oblique tear of the posterior horn of the lateral meniscus extending to the body, multiple mild degenerative changes and chondromalacia of the patella and femoral compartment and a small ruptured Baker's cyst.  He reports that he is ready to proceed with surgery.    Objective   No Known Allergies    Vital Signs:   Pulse 85   Ht 170.2 cm (67\")   Wt 112 kg (247 lb)   SpO2 99%   BMI 38.69 kg/m²       Physical Exam    Constitutional: Awake, alert. Well nourished appearance.    Integumentary: Warm, dry, intact. No obvious rashes.    HENT: Atraumatic, normocephalic.   Respiratory: Non labored respirations .   Cardiovascular: Intact peripheral pulses.    Psychiatric: Normal mood and affect. A&O X3    Ortho Exam  Left knee: Range of motion from 0 to 90 degrees.  Stable to varus and valgus stress.  Negative Lachman's.  Positive Gilberto's.  Neurovascularly intact with full sensation.    Imaging Results (Most Recent)       None                      Assessment and Plan   Problem List Items Addressed This Visit    None  Visit Diagnoses       Tear of lateral meniscus of left knee, current, unspecified tear type, subsequent encounter    -  Primary            Follow Up   Return for Postop.    Patient is a smoker, please discuss smoking cessation options with your primary care provider.    Social History     Socioeconomic History    Marital status: Significant Other   Tobacco Use    Smoking status: Every Day     Packs/day: 0.50     Years: 10.00     Pack years: 5.00     Types: Cigarettes     Start date: 1/1/2007    Smokeless tobacco: Never   Vaping Use    Vaping Use: Never used "   Substance and Sexual Activity    Alcohol use: Never    Drug use: Never    Sexual activity: Yes     Partners: Female     Birth control/protection: None       Patient Instructions   Discussed with patient the option of continuing conservative measures to include intermittent steroid and gel injections.  Patient wishes to proceed with shoulder surgery.    Discussed risk and benefits of surgery with the patient to include infection, bleeding, blood clots and discussed the procedure thoroughly with the patient.  He has no questions at this time and wishes to schedule.      Follow-up postoperatively.  Call with questions, concerns or worsening symptoms.  Patient was given instructions and counseling regarding his condition or for health maintenance advice. Please see specific information pulled into the AVS if appropriate.

## 2023-06-05 NOTE — PATIENT INSTRUCTIONS
Discussed with patient the option of continuing conservative measures to include intermittent steroid and gel injections.  Patient wishes to proceed with shoulder surgery.    Discussed risk and benefits of surgery with the patient to include infection, bleeding, blood clots and discussed the procedure thoroughly with the patient.  He has no questions at this time and wishes to schedule.      Follow-up postoperatively.  Call with questions, concerns or worsening symptoms.

## 2023-06-05 NOTE — TELEPHONE ENCOUNTER
Based on patient history and plan procedure, patient is moderate stable cardiovascular risk for orthopedic surgery.    Continue aspirin throughout due to history of PCI.    No additional testing necessary.

## 2023-06-05 NOTE — TELEPHONE ENCOUNTER
PATIENT WAS CALLED AND INFORMED PER DR CEDILLO NOT TO STOP ASPIRIN PRIOR TO SURGERY.  PATIENT VOICES UNDERSTANDING

## 2023-06-13 NOTE — PRE-PROCEDURE INSTRUCTIONS
IMPORTANT INSTRUCTIONS - PRE-ADMISSION TESTING  DO NOT EAT OR CHEW anything after midnight the night before your procedure.    You may have CLEAR liquids up to _2 hours prior to ARRIVAL time.   Take the following medications the morning of your procedure with JUST A SIP OF WATER: RANEXA, AMLODIPINE,  BUPROPION, ISOSORBIDE    DO NOT BRING your medications to the hospital with you, UNLESS something has changed since your PRE-Admission Testing appointment.  Hold all vitamins, supplements, and NSAIDS (Non- steroidal anti-inflammatory meds) for one week prior to surgery (you MAY take Tylenol or Acetaminophen).  If you are diabetic, check your blood sugar the morning of your procedure. If it is less than 70 or if you are feeling symptomatic, call the following number for further instructions: [phone]  Use your inhalers/nebulizers as usual, the morning of your procedure. BRING YOUR INHALERS with you.   Bring your CPAP or BIPAP to hospital, ONLY IF YOU WILL BE SPENDING THE NIGHT.   Make sure you have a ride home and have someone who will stay with you the day of your procedure after you go home.  If you have any questions, please call your Pre-Admission Testing NurseELIZA_ at [phone]_.   Per anesthesia request, do not smoke for 24 hours before your procedure or as instructed by your surgeon.  Clear Liquid Diet        Find out when you need to start a clear liquid diet.   Think of “clear liquids” as anything you could read a newspaper through. This includes things like water, broth, sports drinks, or tea WITHOUT any kind of milk or cream.           Once you are told to start a clear liquid diet, only drink these things until 2 hours before arrival to the hospital or when the hospital says to stop. Total volume limitation: 8 oz.       Clear liquids you CAN drink:   Water   Clear broth: beef, chicken, vegetable, or bone broth with nothing in it   Gatorade   Lemonade or Gianfranco-aid   Soda   Tea, coffee (NO cream  or honey)   Jell-O (without fruit)   Popsicles (without fruit or cream)   Italian ices   Juice without pulp: apple, white, grape   You may use salt, pepper, and sugar    Do NOT drink:   Milk or cream   Soy milk, almond milk, coconut milk, or other non-dairy drinks and   creamers   Milkshakes or smoothies   Tomato juice   Orange juice   Grapefruit juice   Cream soups or any other than broth         Clear Liquid Diet:  Do NOT eat any solid food.  Do NOT eat or suck on mints or candy.  Do NOT chew gum.  Do NOT drink thick liquids like milk or juice with pulp in it.  Do NOT add milk, cream, or anything like soy milk or almond milk to coffee or tea.

## 2023-06-15 ENCOUNTER — ANESTHESIA EVENT (OUTPATIENT)
Dept: PERIOP | Facility: HOSPITAL | Age: 38
End: 2023-06-15
Payer: COMMERCIAL

## 2023-06-15 ENCOUNTER — HOSPITAL ENCOUNTER (OUTPATIENT)
Facility: HOSPITAL | Age: 38
Setting detail: HOSPITAL OUTPATIENT SURGERY
Discharge: HOME | End: 2023-06-15
Attending: ORTHOPAEDIC SURGERY | Admitting: ORTHOPAEDIC SURGERY
Payer: COMMERCIAL

## 2023-06-15 ENCOUNTER — ANESTHESIA (OUTPATIENT)
Dept: PERIOP | Facility: HOSPITAL | Age: 38
End: 2023-06-15
Payer: COMMERCIAL

## 2023-06-15 VITALS
SYSTOLIC BLOOD PRESSURE: 138 MMHG | WEIGHT: 248.9 LBS | DIASTOLIC BLOOD PRESSURE: 96 MMHG | BODY MASS INDEX: 39.07 KG/M2 | HEART RATE: 68 BPM | HEIGHT: 67 IN | RESPIRATION RATE: 18 BRPM | TEMPERATURE: 98.1 F | OXYGEN SATURATION: 92 %

## 2023-06-15 DIAGNOSIS — Z01.818 PREOPERATIVE EXAMINATION: ICD-10-CM

## 2023-06-15 DIAGNOSIS — M23.301 DERANGEMENT OF LATERAL MENISCUS OF LEFT KNEE: Primary | ICD-10-CM

## 2023-06-15 LAB — QT INTERVAL: 402 MS

## 2023-06-15 PROCEDURE — 25010000002 MIDAZOLAM PER 1MG: Performed by: ANESTHESIOLOGY

## 2023-06-15 PROCEDURE — 25010000002 ONDANSETRON PER 1 MG: Performed by: NURSE ANESTHETIST, CERTIFIED REGISTERED

## 2023-06-15 PROCEDURE — 25010000002 DEXAMETHASONE PER 1 MG: Performed by: NURSE ANESTHETIST, CERTIFIED REGISTERED

## 2023-06-15 PROCEDURE — 25010000002 FENTANYL CITRATE (PF) 50 MCG/ML SOLUTION: Performed by: NURSE ANESTHETIST, CERTIFIED REGISTERED

## 2023-06-15 PROCEDURE — 25010000002 PROPOFOL 10 MG/ML EMULSION: Performed by: NURSE ANESTHETIST, CERTIFIED REGISTERED

## 2023-06-15 PROCEDURE — 93005 ELECTROCARDIOGRAM TRACING: CPT | Performed by: ORTHOPAEDIC SURGERY

## 2023-06-15 PROCEDURE — 25010000002 KETOROLAC TROMETHAMINE PER 15 MG: Performed by: NURSE ANESTHETIST, CERTIFIED REGISTERED

## 2023-06-15 PROCEDURE — 25010000002 CEFAZOLIN IN DEXTROSE 2-4 GM/100ML-% SOLUTION

## 2023-06-15 PROCEDURE — 25010000002 HYDROMORPHONE 1 MG/ML SOLUTION: Performed by: NURSE ANESTHETIST, CERTIFIED REGISTERED

## 2023-06-15 RX ORDER — ONDANSETRON 2 MG/ML
4 INJECTION INTRAMUSCULAR; INTRAVENOUS ONCE AS NEEDED
Status: DISCONTINUED | OUTPATIENT
Start: 2023-06-15 | End: 2023-06-15 | Stop reason: HOSPADM

## 2023-06-15 RX ORDER — KETOROLAC TROMETHAMINE 30 MG/ML
INJECTION, SOLUTION INTRAMUSCULAR; INTRAVENOUS AS NEEDED
Status: DISCONTINUED | OUTPATIENT
Start: 2023-06-15 | End: 2023-06-15 | Stop reason: SURG

## 2023-06-15 RX ORDER — HYDROCODONE BITARTRATE AND ACETAMINOPHEN 7.5; 325 MG/1; MG/1
1 TABLET ORAL EVERY 4 HOURS PRN
Qty: 25 TABLET | Refills: 0 | Status: SHIPPED | OUTPATIENT
Start: 2023-06-15

## 2023-06-15 RX ORDER — MIDAZOLAM HYDROCHLORIDE 2 MG/2ML
2 INJECTION, SOLUTION INTRAMUSCULAR; INTRAVENOUS ONCE
Status: COMPLETED | OUTPATIENT
Start: 2023-06-15 | End: 2023-06-15

## 2023-06-15 RX ORDER — PROMETHAZINE HYDROCHLORIDE 25 MG/1
25 SUPPOSITORY RECTAL ONCE AS NEEDED
Status: DISCONTINUED | OUTPATIENT
Start: 2023-06-15 | End: 2023-06-15 | Stop reason: HOSPADM

## 2023-06-15 RX ORDER — MAGNESIUM HYDROXIDE 1200 MG/15ML
LIQUID ORAL AS NEEDED
Status: DISCONTINUED | OUTPATIENT
Start: 2023-06-15 | End: 2023-06-15 | Stop reason: HOSPADM

## 2023-06-15 RX ORDER — PROPOFOL 10 MG/ML
VIAL (ML) INTRAVENOUS AS NEEDED
Status: DISCONTINUED | OUTPATIENT
Start: 2023-06-15 | End: 2023-06-15 | Stop reason: SURG

## 2023-06-15 RX ORDER — ACETAMINOPHEN 500 MG
1000 TABLET ORAL ONCE
Status: COMPLETED | OUTPATIENT
Start: 2023-06-15 | End: 2023-06-15

## 2023-06-15 RX ORDER — PROMETHAZINE HYDROCHLORIDE 12.5 MG/1
25 TABLET ORAL ONCE AS NEEDED
Status: DISCONTINUED | OUTPATIENT
Start: 2023-06-15 | End: 2023-06-15 | Stop reason: HOSPADM

## 2023-06-15 RX ORDER — SODIUM CHLORIDE, SODIUM LACTATE, POTASSIUM CHLORIDE, CALCIUM CHLORIDE 600; 310; 30; 20 MG/100ML; MG/100ML; MG/100ML; MG/100ML
9 INJECTION, SOLUTION INTRAVENOUS CONTINUOUS PRN
Status: DISCONTINUED | OUTPATIENT
Start: 2023-06-15 | End: 2023-06-15 | Stop reason: HOSPADM

## 2023-06-15 RX ORDER — ONDANSETRON 2 MG/ML
INJECTION INTRAMUSCULAR; INTRAVENOUS AS NEEDED
Status: DISCONTINUED | OUTPATIENT
Start: 2023-06-15 | End: 2023-06-15 | Stop reason: SURG

## 2023-06-15 RX ORDER — OXYCODONE HYDROCHLORIDE 5 MG/1
5 TABLET ORAL
Status: COMPLETED | OUTPATIENT
Start: 2023-06-15 | End: 2023-06-15

## 2023-06-15 RX ORDER — DEXAMETHASONE SODIUM PHOSPHATE 4 MG/ML
INJECTION, SOLUTION INTRA-ARTICULAR; INTRALESIONAL; INTRAMUSCULAR; INTRAVENOUS; SOFT TISSUE AS NEEDED
Status: DISCONTINUED | OUTPATIENT
Start: 2023-06-15 | End: 2023-06-15 | Stop reason: SURG

## 2023-06-15 RX ORDER — CEFAZOLIN SODIUM 2 G/100ML
2 INJECTION, SOLUTION INTRAVENOUS ONCE
Status: COMPLETED | OUTPATIENT
Start: 2023-06-15 | End: 2023-06-15

## 2023-06-15 RX ORDER — CEFAZOLIN SODIUM IN 0.9 % NACL 3 G/100 ML
3 INTRAVENOUS SOLUTION, PIGGYBACK (ML) INTRAVENOUS ONCE
Status: DISCONTINUED | OUTPATIENT
Start: 2023-06-15 | End: 2023-06-15 | Stop reason: DRUGHIGH

## 2023-06-15 RX ORDER — BUPIVACAINE HYDROCHLORIDE AND EPINEPHRINE 5; 5 MG/ML; UG/ML
INJECTION, SOLUTION EPIDURAL; INTRACAUDAL; PERINEURAL AS NEEDED
Status: DISCONTINUED | OUTPATIENT
Start: 2023-06-15 | End: 2023-06-15 | Stop reason: HOSPADM

## 2023-06-15 RX ORDER — FENTANYL CITRATE 50 UG/ML
INJECTION, SOLUTION INTRAMUSCULAR; INTRAVENOUS AS NEEDED
Status: DISCONTINUED | OUTPATIENT
Start: 2023-06-15 | End: 2023-06-15 | Stop reason: SURG

## 2023-06-15 RX ORDER — GLYCOPYRROLATE 0.2 MG/ML
INJECTION INTRAMUSCULAR; INTRAVENOUS AS NEEDED
Status: DISCONTINUED | OUTPATIENT
Start: 2023-06-15 | End: 2023-06-15 | Stop reason: SURG

## 2023-06-15 RX ORDER — MEPERIDINE HYDROCHLORIDE 25 MG/ML
12.5 INJECTION INTRAMUSCULAR; INTRAVENOUS; SUBCUTANEOUS
Status: DISCONTINUED | OUTPATIENT
Start: 2023-06-15 | End: 2023-06-15 | Stop reason: HOSPADM

## 2023-06-15 RX ORDER — LIDOCAINE HYDROCHLORIDE 20 MG/ML
INJECTION, SOLUTION EPIDURAL; INFILTRATION; INTRACAUDAL; PERINEURAL AS NEEDED
Status: DISCONTINUED | OUTPATIENT
Start: 2023-06-15 | End: 2023-06-15 | Stop reason: SURG

## 2023-06-15 RX ADMIN — CEFAZOLIN SODIUM 2 G: 2 INJECTION, SOLUTION INTRAVENOUS at 07:57

## 2023-06-15 RX ADMIN — SODIUM CHLORIDE, POTASSIUM CHLORIDE, SODIUM LACTATE AND CALCIUM CHLORIDE 9 ML/HR: 600; 310; 30; 20 INJECTION, SOLUTION INTRAVENOUS at 07:13

## 2023-06-15 RX ADMIN — FENTANYL CITRATE 25 MCG: 50 INJECTION, SOLUTION INTRAMUSCULAR; INTRAVENOUS at 08:19

## 2023-06-15 RX ADMIN — ONDANSETRON 4 MG: 2 INJECTION INTRAMUSCULAR; INTRAVENOUS at 08:10

## 2023-06-15 RX ADMIN — MIDAZOLAM HYDROCHLORIDE 2 MG: 1 INJECTION, SOLUTION INTRAMUSCULAR; INTRAVENOUS at 07:50

## 2023-06-15 RX ADMIN — DEXAMETHASONE SODIUM PHOSPHATE 4 MG: 4 INJECTION, SOLUTION INTRA-ARTICULAR; INTRALESIONAL; INTRAMUSCULAR; INTRAVENOUS; SOFT TISSUE at 08:07

## 2023-06-15 RX ADMIN — FENTANYL CITRATE 25 MCG: 50 INJECTION, SOLUTION INTRAMUSCULAR; INTRAVENOUS at 08:17

## 2023-06-15 RX ADMIN — OXYCODONE HYDROCHLORIDE 5 MG: 5 TABLET ORAL at 09:12

## 2023-06-15 RX ADMIN — HYDROMORPHONE HYDROCHLORIDE 0.5 MG: 1 INJECTION, SOLUTION INTRAMUSCULAR; INTRAVENOUS; SUBCUTANEOUS at 08:52

## 2023-06-15 RX ADMIN — GLYCOPYRROLATE 0.2 MG: 0.2 INJECTION INTRAMUSCULAR; INTRAVENOUS at 08:19

## 2023-06-15 RX ADMIN — KETOROLAC TROMETHAMINE 30 MG: 30 INJECTION, SOLUTION INTRAMUSCULAR; INTRAVENOUS at 08:21

## 2023-06-15 RX ADMIN — LIDOCAINE HYDROCHLORIDE 100 MG: 20 INJECTION, SOLUTION EPIDURAL; INFILTRATION; INTRACAUDAL; PERINEURAL at 07:59

## 2023-06-15 RX ADMIN — OXYCODONE HYDROCHLORIDE 5 MG: 5 TABLET ORAL at 08:52

## 2023-06-15 RX ADMIN — ACETAMINOPHEN 1000 MG: 500 TABLET ORAL at 07:16

## 2023-06-15 RX ADMIN — FENTANYL CITRATE 50 MCG: 50 INJECTION, SOLUTION INTRAMUSCULAR; INTRAVENOUS at 07:58

## 2023-06-15 RX ADMIN — PROPOFOL 200 MG: 10 INJECTION, EMULSION INTRAVENOUS at 08:00

## 2023-06-15 NOTE — DISCHARGE INSTRUCTIONS
DISCHARGE INSTRUCTIONS  POST-OPERATIVE  Knee Arthroscopic Surgery      For your surgery you had:  General anesthesia (you may have a sore throat for the first 24 hours)  You may experience dizziness, drowsiness, or light-headedness for several hours following surgery/procedure.  Do not stay alone today or tonight.  Limit your activity for 24 hours.  Resume your diet slowly.  Follow whatever special dietary instructions you may have been given by your doctor.  You should not drive or operate machinery or drink alcohol for 24 hours or while you are taking pain medication.  You should not sign legally binding documents.  [] Post-Operative Day #1  Post-Operative Day #1 is counted as the 1st day after surgery.  Leave ace wrap on day one to aid in the reduction of swelling.  If dressing is too tight it can be rewrapped.  Post-Operative Day #2  Saturday  Ace wrap and dressing may be removed.  Put a 4x4 dressing to suture or staple site.  Change dressing once or twice daily until suture or staples are removed.  It is normal to have some redness or irritation around skin sutures or stapes, however, if you have any expanding areas of redness with a persistent fever and increasing pain notify the physician as soon as possible.  On Post-Operative Day #2, you may want to reapply the ace wrap.  Put ace wrap directly over the knee to add compression and aid in swelling reduction.  It is normal to have some swelling down into the calf and ankle after knee arthroscopy or Anterior Cruciate Ligament (ACL) reconstruction.  If you notice a significant amount of swelling or increasing pain in calf area, notify the physician immediately.   Post-Operative Day #4 Monday  You may shower.  During shower, avoid too much water to incision site.  Let water run down leg and then pat dry.  Do not put any ointments on the incision unless directed by surgeon.  Reapply dry dressing to sutures or staple sites.    General Information  Full weight  bearing with crutches is allowed after a standard knee arthroscopy or ACL reconstruction unless instructed otherwise by surgeon.  You may put as much weight on knee as tolerable.  If given a knee immobilizer postoperatively, usually with an ACL reconstruction, this is for protection with early ambulation until you are steadier on your feet.  It is very important to take off immobilizer to do range of motion and flexion and extension exercises.  You do not have to sleep in the knee immobilizer.  Knee range of motion exercises should be started as early as the day of surgery.  Try to achieve full extension and start working on range of motion and flexion of the knee.  Move the ankle up and down periodically to help reduce swelling as well as reduce blood pooling.  To allow full extension of the knee and prevent blood clots it is very important to put pillows at the level of the mid-calf to the foot.  DO NOT put pillows directly behind knee.  SPECIAL INSTRUCTIONS:                                                             DISCHARGE INSTRUCTIONS  POST-OPERATIVE   Knee Arthroscopic Surgery      General Instructions  You will receive a prescription for physical therapy, unless otherwise instructed by physician.  Physical therapy is imperative especially after ACL reconstruction and some knee arthroscopies for swelling reduction, knee range of motion and strengthening.  [] Use ice pack on the knee for 20 minutes on and 20 minutes off.  Never place ice directly on the skin.  By following this, you will cool the knee sufficiently.  Avoid getting dressing wet.  To help reduce swelling and minimize throbbing pain, use pillows to keep the knee elevated above the level of the heart, even while sleeping.  Sit with the leg propped up on a footstool or chair with pillows.  Exercises toes for 10 minutes every hour while awake.  If you are given a prescription for pain medication, take it as prescribed.  If you are able to take  over-the-counter anti-inflammatory medications such as Motrin or Aleve, you may take as per package instructions in between the pain medication to help enhance pain control and reduce swelling.  If you are taking the pain medication prescribed, do not take Tylenol too unless you consult with the pharmacist. Generally, the pain medications prescribed have Tylenol in them and too much Tylenol can be harmful.  You should stop the anti-inflammatory medication if you experience any stomach/GI disturbances.  Consult with your physician or your pharmacist before taking over-the-counter pain medications/anti-inflammatories. It is not uncommon to have a fever post-operatively especially in the first 24-48 hours.  Deep breathing and coughing and early ambulation will help.  Anti-inflammatories can be used for fever reduction also.  If unable to urinate 6 to 8 hours after surgery or urinating frequently in small amounts, notify the physician or go to the nearest Emergency Room.  NOTIFY YOUR PHYSICIAN IF YOU EXPERIENCE:  Numbness of toes.  Inability to move toes.  Extreme coldness, paleness or blue dis-coloration of toes.  Any pain other than from the incision, such as swelling of the leg that blocks circulation of the toes.  Follow verbal instructions from your doctor.  Medications per physician instructions as indicated on Discharge Medication Information Sheet.  You should see  ______________________for follow-up care  on   .  Phone number: _________________________  Missing your appointment/follow-up could lead to serious complications.  If you have an emergency and cannot reach your doctor, go to an Emergency Room nearest your home.

## 2023-06-15 NOTE — OP NOTE
KNEE ARTHROSCOPY WITH MENISCECTOMY  Procedure Report    Patient Name:  Deo Pacheco  YOB: 1985    Date of Surgery:  6/15/2023     Indications: The H&P  Pre-op Diagnosis:   Preoperative examination [Z01.818]  Left knee lateral meniscus tear       Post-Op Diagnosis Codes:     * Preoperative examination [Z01.818]  Same  Procedure/CPT® Codes:      Procedure(s):  Left KNEE ARTHROSCOPY WITH PARTIAL LATERAL MENISCECTOMY    Staff:  Surgeon(s):  Suraj Nair MD    Assistant: James Mensah    Anesthesia: General    Estimated Blood Loss: none    Implants:    Nothing was implanted during the procedure    Specimen:          None        Findings: See description    Complications: None    Description of Procedure: Surgical timeout was called. Operative extremity was correctly identified in the operating room suite. Patient was administered antibiotics per the SCIP protocol. The knee was examined under anesthesia. Lachman test was negative. Anterior and posterior drawer signs were negative. There was no medial or lateral instability. Pivot shift sign was negative. The leg was prepped and draped and applied an arthroscopic leg mccall. The joint was examined in a systematic fashion. Arthroscopic portals were established. The patellofemoral joint was visualized. Findings in the patellofemoral joint included no significant chondromalacia patellofemoral joint the patella tracked in the center of the trochlear groove the femur.      The scope was then repositioned into the medial joint space. The intraoperative findings of the medial joint space included pristine to probing.  The ACL and PCL were taut and intact with probing. The scope was then introduced into the lateral joint space. The intraoperative findings on the lateral joint space included oblique flap tear of the lateral meniscus which was debrided with a basket and shaver there is no chondromalacia in the lateral compartment.  The medial and  lateral gutters were carefully inspected and some loose fragments of articular cartilage were identified and removed. Arthroscopic fluid was evacuated from the joint. The portals were carefully approximated. Intraarticular analgesic was injected for postoperative analgesia. Occlusive dressing was applied. Sponge, gauze and needle count was correct. No complications were encountered.  Patient was reversed from anesthetic and taken from the operating room to the recovery room in stable, satisfactory condition.       Suraj Nair MD     Date: 6/15/2023  Time: 09:02 EDT

## 2023-06-15 NOTE — ANESTHESIA POSTPROCEDURE EVALUATION
Bentyl 3-4 times a day regularly  Get Thyroid panel done with primary physician.  96 ounces of water daily   Patient: Deo Pacheco    Procedure Summary     Date: 06/15/23 Room / Location: [facility] OR  / [facility]    Anesthesia Start: 0754 Anesthesia Stop: 0841    Procedure: KNEE ARTHROSCOPY WITH PARTIAL LATERAL MENISCECTOMY (Left) Diagnosis:       Preoperative examination      (Preoperative examination [Z01.818])    Surgeons: Suraj Nair MD Provider: Krunal Rudolph MD    Anesthesia Type: general ASA Status: 3          Anesthesia Type: general    Vitals  Vitals Value Taken Time   /96 06/15/23 0925   Temp 36.7 °C (98.1 °F) 06/15/23 0925   Pulse 74 06/15/23 0928   Resp 18 06/15/23 0910   SpO2 93 % 06/15/23 0928   Vitals shown include unvalidated device data.        Post Anesthesia Care and Evaluation    Patient location during evaluation: bedside  Patient participation: complete - patient participated  Level of consciousness: awake and alert  Pain management: adequate    Airway patency: patent  Anesthetic complications: No anesthetic complications  PONV Status: none  Cardiovascular status: acceptable  Respiratory status: acceptable  Hydration status: acceptable    Comments: An Anesthesiologist personally participated in the most demanding procedures (including induction and emergence if applicable) in the anesthesia plan, monitored the course of anesthesia administration at frequent intervals and remained physically present and available for immediate diagnosis and treatment of emergencies.

## 2023-06-15 NOTE — H&P
"H and p      Chief Complaint  Follow-up and Pain of the Left Knee        Subjective          Deo Pacheco presents to [facility] for follow-up of left knee pain.  He received a steroid injection on 5/15/2023 that he reports he feels approximately 35 to 40% better.  Reports the pain of 6/10 with walking.  Had an MRI on 5/8/2023 showing an oblique tear of the posterior horn of the lateral meniscus extending to the body, multiple mild degenerative changes and chondromalacia of the patella and femoral compartment and a small ruptured Baker's cyst.  He reports that he is ready to proceed with surgery.           Objective      No Known Allergies     Vital Signs:   Pulse 85   Ht 170.2 cm (67\")   Wt 112 kg (247 lb)   SpO2 99%   BMI 38.69 kg/m²        Physical Exam                         Constitutional: Awake, alert. Well nourished appearance.               Integumentary: Warm, dry, intact. No obvious rashes.               HENT: Atraumatic, normocephalic.              Respiratory: Non labored respirations .              Cardiovascular: Intact peripheral pulses.               Psychiatric: Normal mood and affect. A&O X3     Ortho Exam  Left knee: Range of motion from 0 to 90 degrees.  Stable to varus and valgus stress.  Negative Lachman's.  Positive Gilberto's.  Neurovascularly intact with full sensation.     Imaging Results (Most Recent)         None                             Assessment      Assessment and Plan   Problem List Items Addressed This Visit    None  Visit Diagnoses         Tear of lateral meniscus of left knee, current, unspecified tear type, subsequent encounter    -  Primary                Follow Up   Return for Postop.     Patient is a smoker, please discuss smoking cessation options with your primary care provider.     Social History   Social History            Socioeconomic History    Marital status: Significant Other   Tobacco Use    Smoking status: Every Day       " Packs/day: 0.50       Years: 10.00       Pack years: 5.00       Types: Cigarettes       Start date: 1/1/2007    Smokeless tobacco: Never   Vaping Use    Vaping Use: Never used   Substance and Sexual Activity    Alcohol use: Never    Drug use: Never    Sexual activity: Yes       Partners: Female       Birth control/protection: None            Patient Instructions   Discussed with patient the option of continuing conservative measures to include intermittent steroid and gel injections.  Patient wishes to proceed with shoulder surgery.     Discussed risk and benefits of surgery with the patient to include infection, bleeding, blood clots and discussed the procedure thoroughly with the patient.  He has no questions at this time and wishes to schedule.        Follow-up postoperatively.  Call with questions, concerns or worsening symptoms.  Patient was given instructions and counseling regarding his condition or for health maintenance advice. Please see specific information pulled into the AVS if appropriate.             Suraj Nair MD  06/15/23

## 2023-06-15 NOTE — ANESTHESIA PREPROCEDURE EVALUATION
Anesthesia Evaluation     Patient summary reviewed and Nursing notes reviewed   no history of anesthetic complications:  NPO Solid Status: > 8 hours  NPO Liquid Status: > 2 hours           Airway   Mallampati: II  TM distance: >3 FB  Neck ROM: full  No difficulty expected  Dental    (+) edentulous    Pulmonary - negative pulmonary ROS and normal exam    breath sounds clear to auscultation  Cardiovascular - normal exam  Exercise tolerance: good (4-7 METS)    Rhythm: regular  Rate: normal    (+) hypertension, past MI  >12 months, CAD,       Neuro/Psych  (+) seizures well controlled,    GI/Hepatic/Renal/Endo - negative ROS     Musculoskeletal (-) negative ROS    Abdominal    Substance History - negative use     OB/GYN negative ob/gyn ROS         Other - negative ROS       ROS/Med Hx Other: HX OF MI W/ STENT 2019, EPILEPSY LAST SZ 2015, SMOKER.  LAST O.V. DR. CEDILLO 12/14/23. STRESS 04/20/22 EF 50%, NORMAL STRESS, INTERMEDIATE RISK STUDY.  ECHO 4/20/22EF 61-65%, NO VALVE ISSUES.  CLEARED WITH MODERATE RISK.  METS>4. NO CP/SOA. ELM                   Anesthesia Plan    ASA 3     general     (Patient understands anesthesia not responsible for dental damage.)  intravenous induction     Anesthetic plan, risks, benefits, and alternatives have been provided, discussed and informed consent has been obtained with: patient.  Pre-procedure education provided  Plan discussed with CRNA.        CODE STATUS:

## 2023-07-26 ENCOUNTER — OFFICE VISIT (OUTPATIENT)
Dept: ORTHOPEDIC SURGERY | Facility: CLINIC | Age: 38
End: 2023-07-26
Payer: COMMERCIAL

## 2023-07-26 VITALS
HEART RATE: 82 BPM | OXYGEN SATURATION: 97 % | HEIGHT: 67 IN | WEIGHT: 240.3 LBS | SYSTOLIC BLOOD PRESSURE: 159 MMHG | BODY MASS INDEX: 37.72 KG/M2 | DIASTOLIC BLOOD PRESSURE: 101 MMHG

## 2023-07-26 DIAGNOSIS — Z47.89 AFTERCARE FOLLOWING SURGERY OF THE MUSCULOSKELETAL SYSTEM: Primary | ICD-10-CM

## 2023-07-26 PROCEDURE — 99024 POSTOP FOLLOW-UP VISIT: CPT | Performed by: PHYSICIAN ASSISTANT

## 2023-07-26 NOTE — PATIENT INSTRUCTIONS
Patient is doing very well. Advised to continue PT to completion to progress strength and ROM. Updated PT order placed. Continue home exercises.     Continue icing knee as needed up to 4 times daily for no longer than 15-20 mins at a time.     Follow-up in 6 weeks. No x-rays needed. Call with changes or concerns.

## 2023-07-26 NOTE — PROGRESS NOTES
"Chief Complaint  Follow-up of the Left Knee    Subjective      Deo Pacheco presents to [facility] for follow-up of left knee arthroscopy with partial lateral meniscectomy performed by Dr. Nair on 6/15/2023.  He presents independently ambulatory without use of assistive device.  Reports that he can \"walk great compared to what I used to do\".  He does report persistent pain and difficulties with stairs, kneeling directly on his knee, or crossing his legs.  He has had some difficulty is with return to work as a , as this requires frequent kneeling.  Patient has remained in outpatient physical therapy through [facility] and feels he would benefit from continued PT.    Objective   No Known Allergies    Vital Signs:   BP (!) 159/101 Comment: patient forgot to take his BP medication  Pulse 82   Ht 170.2 cm (67\")   Wt 109 kg (240 lb 4.8 oz)   SpO2 97%   BMI 37.64 kg/m²       Physical Exam    Constitutional: Awake, alert. Well nourished appearance.    Integumentary: Warm, dry, intact. No obvious rashes.    HENT: Atraumatic, normocephalic.   Respiratory: Non labored respirations .   Cardiovascular: Intact peripheral pulses.    Psychiatric: Normal mood and affect. A&O X3    Ortho Exam  Right knee: Skin is warm, dry, and intact.  Well-healed surgical scars noted.  No edema.  Patella is well tracking and knee is stable to varus and valgus stress.  Full knee extension and flexion to 125 degrees.  Full plantarflexion dorsiflexion of the ankle.  Sensation intact light touch.  Distal neurovascular intact.    Imaging Results (Most Recent)       None                    Assessment and Plan   Problem List Items Addressed This Visit    None  Visit Diagnoses       Aftercare following L knee arthroscopy with PLM    -  Primary    Relevant Orders    Ambulatory Referral to Physical Therapy POST OP (Completed)          Follow Up   Return in about 6 weeks (around 9/6/2023).    Tobacco Use: High Risk "    Smoking Tobacco Use: Every Day    Smokeless Tobacco Use: Never    Passive Exposure: Not on file     Educated on risk of smoking. Discussed options for smoking cessation.    Patient Instructions   Patient is doing very well. Advised to continue PT to completion to progress strength and ROM. Updated PT order placed. Continue home exercises.     Continue icing knee as needed up to 4 times daily for no longer than 15-20 mins at a time.     Follow-up in 6 weeks. No x-rays needed. Call with changes or concerns.     Patient was given instructions and counseling regarding his condition or for health maintenance advice. Please see specific information pulled into the AVS if appropriate.

## 2023-08-21 ENCOUNTER — HOSPITAL ENCOUNTER (OUTPATIENT)
Dept: MRI IMAGING | Facility: HOSPITAL | Age: 38
Discharge: HOME | End: 2023-08-21
Admitting: ORTHOPAEDIC SURGERY
Payer: COMMERCIAL

## 2023-08-21 DIAGNOSIS — M25.562 LEFT KNEE PAIN, UNSPECIFIED CHRONICITY: ICD-10-CM

## 2023-08-21 PROCEDURE — 73721 MRI JNT OF LWR EXTRE W/O DYE: CPT

## 2023-09-01 ENCOUNTER — OFFICE VISIT (OUTPATIENT)
Dept: ORTHOPEDIC SURGERY | Facility: CLINIC | Age: 38
End: 2023-09-01
Payer: COMMERCIAL

## 2023-09-01 VITALS
BODY MASS INDEX: 37.67 KG/M2 | HEIGHT: 67 IN | WEIGHT: 240 LBS | SYSTOLIC BLOOD PRESSURE: 133 MMHG | OXYGEN SATURATION: 97 % | DIASTOLIC BLOOD PRESSURE: 92 MMHG | HEART RATE: 76 BPM

## 2023-09-01 DIAGNOSIS — G89.29 CHRONIC PAIN OF RIGHT KNEE: ICD-10-CM

## 2023-09-01 DIAGNOSIS — G89.29 CHRONIC PAIN OF RIGHT KNEE: Primary | ICD-10-CM

## 2023-09-01 DIAGNOSIS — M25.561 CHRONIC PAIN OF RIGHT KNEE: ICD-10-CM

## 2023-09-01 DIAGNOSIS — M25.561 CHRONIC PAIN OF RIGHT KNEE: Primary | ICD-10-CM

## 2023-09-01 DIAGNOSIS — S82.831D CLOSED FRACTURE OF PROXIMAL END OF RIGHT FIBULA WITH ROUTINE HEALING, UNSPECIFIED FRACTURE MORPHOLOGY, SUBSEQUENT ENCOUNTER: Primary | ICD-10-CM
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
## 2023-09-01 NOTE — PATIENT INSTRUCTIONS
MRI results discussed and reviewed with patient. Discussed options of home exercise program, referral to physical therapy, trial of NSAIDs, intra-articular steroid injections, or viscosupplementation. Patient declines injections. Agreeable to PT and orders placed. Continue diclofenac as needed.  Do not take diclofenac with other NSAIDs.    Note for work provided.     Follow up in 6 weeks. No imaging. Call with changes or concerns.

## 2023-09-01 NOTE — TELEPHONE ENCOUNTER
Patient seen in office today with Katia Mcneil.  Requesting nerve pain relief.  [facility] [place].

## 2023-09-06 ENCOUNTER — OFFICE VISIT (OUTPATIENT)
Dept: ORTHOPEDIC SURGERY | Facility: CLINIC | Age: 38
End: 2023-09-06
Payer: COMMERCIAL

## 2023-09-06 VITALS — BODY MASS INDEX: 37.67 KG/M2 | HEIGHT: 67 IN | WEIGHT: 240 LBS

## 2023-09-06 DIAGNOSIS — Z47.89 AFTERCARE FOLLOWING SURGERY OF THE MUSCULOSKELETAL SYSTEM: Primary | ICD-10-CM

## 2023-09-06 PROCEDURE — 99024 POSTOP FOLLOW-UP VISIT: CPT | Performed by: PHYSICIAN ASSISTANT

## 2023-09-06 NOTE — PROGRESS NOTES
"Chief Complaint  Pain and Follow-up of the Left Knee    Subjective      Deo Pacheco presents to [facility] for follow-up of left knee arthroscopy with partial lateral meniscectomy performed by Dr. Nair on 6/15/2023.  He presents today independently ambulatory without use of assistive device.  Reports that his knee is \"about the same\".  He reports persistent pain, particularly with knee flexion at end ROM.  He has been out of physical therapy for approximately 3 weeks, while he was awaiting results of right knee MRI.  He does have plans to return to PT.  He has been taking diclofenac on an as-needed basis.    Objective   No Known Allergies    Vital Signs:   Ht 170.2 cm (67\")   Wt 109 kg (240 lb)   BMI 37.59 kg/m²       Physical Exam    Constitutional: Awake, alert. Well nourished appearance.    Integumentary: Warm, dry, intact. No obvious rashes.    HENT: Atraumatic, normocephalic.   Respiratory: Non labored respirations .   Cardiovascular: Intact peripheral pulses.    Psychiatric: Normal mood and affect. A&O X3    Ortho Exam  Left knee: Skin is warm, dry, and intact.  Well-healed surgical scars noted.  No edema.  Full knee extension and flexion to 120 degrees.  Full plantarflexion and dorsiflexion of the ankle.  Sensation intact light touch.  Distal neurovascular intact.  Smooth sit to stand transition.  Patient fully weightbearing with nonantalgic gait.    Imaging Results (Most Recent)       None                      Assessment and Plan   Problem List Items Addressed This Visit    None  Visit Diagnoses       Aftercare following L knee arthroscopy with PLM    -  Primary    Relevant Orders    Ambulatory Referral to Physical Therapy POST OP (Completed)            Follow Up   Return in about 6 weeks (around 10/18/2023).    Tobacco Use: High Risk    Smoking Tobacco Use: Every Day    Smokeless Tobacco Use: Never    Passive Exposure: Not on file     Educated on risk of smoking. " Discussed options for smoking cessation.    Patient Instructions   Patient has been out of PT approx 3 weeks, while awaiting results of R knee MRI, which were reviewed with patient last week. Advised return to PT. Updated PT orders placed. Continue home exercises. Patient declines steroid injections and is not interested in visco. Continue diclofenac as needed. Do not take with other NSAIDs.     Follow up in 6 weeks. No imaging. Call with changes or concerns.   Patient was given instructions and counseling regarding his condition or for health maintenance advice. Please see specific information pulled into the AVS if appropriate.

## 2023-09-06 NOTE — PATIENT INSTRUCTIONS
Patient has been out of PT approx 3 weeks, while awaiting results of R knee MRI, which were reviewed with patient last week. Advised return to PT. Updated PT orders placed. Continue home exercises. Patient declines steroid injections and is not interested in visco. Continue diclofenac as needed. Do not take with other NSAIDs.     Follow up in 6 weeks. No imaging. Call with changes or concerns.

## 2023-10-16 ENCOUNTER — OFFICE VISIT (OUTPATIENT)
Dept: ORTHOPEDIC SURGERY | Facility: CLINIC | Age: 38
End: 2023-10-16
Payer: COMMERCIAL

## 2023-10-16 VITALS — HEIGHT: 67 IN | BODY MASS INDEX: 37.67 KG/M2 | WEIGHT: 240 LBS

## 2023-10-16 DIAGNOSIS — G89.29 CHRONIC PAIN OF RIGHT KNEE: Primary | ICD-10-CM

## 2023-10-16 DIAGNOSIS — M17.11 PRIMARY OSTEOARTHRITIS OF RIGHT KNEE: ICD-10-CM

## 2023-10-16 DIAGNOSIS — M25.561 CHRONIC PAIN OF RIGHT KNEE: Primary | ICD-10-CM

## 2023-10-16 DIAGNOSIS — Q68.6 DISCOID LATERAL MENISCUS OF RIGHT KNEE: ICD-10-CM

## 2023-10-16 PROCEDURE — 20610 DRAIN/INJ JOINT/BURSA W/O US: CPT | Performed by: PHYSICIAN ASSISTANT

## 2023-10-16 PROCEDURE — 99214 OFFICE O/P EST MOD 30 MIN: CPT | Performed by: PHYSICIAN ASSISTANT

## 2023-10-16 RX ORDER — LIDOCAINE HYDROCHLORIDE 10 MG/ML
5 INJECTION, SOLUTION EPIDURAL; INFILTRATION; INTRACAUDAL; PERINEURAL
Status: COMPLETED | OUTPATIENT
Start: 2023-10-16 | End: 2023-10-16

## 2023-10-16 RX ORDER — TRIAMCINOLONE ACETONIDE 40 MG/ML
40 INJECTION, SUSPENSION INTRA-ARTICULAR; INTRAMUSCULAR
Status: COMPLETED | OUTPATIENT
Start: 2023-10-16 | End: 2023-10-16

## 2023-10-16 RX ADMIN — TRIAMCINOLONE ACETONIDE 40 MG: 40 INJECTION, SUSPENSION INTRA-ARTICULAR; INTRAMUSCULAR at 08:39

## 2023-10-16 RX ADMIN — LIDOCAINE HYDROCHLORIDE 5 ML: 10 INJECTION, SOLUTION EPIDURAL; INFILTRATION; INTRACAUDAL; PERINEURAL at 08:39
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
## 2023-10-16 NOTE — PATIENT INSTRUCTIONS
Patient reports minimal improvement with diclofenac and participation in physical therapy.  Previous MRI was again tested and reviewed with patient.  Unfortunately there are no surgical findings that correlate with the patient's reported pain at the medial aspect of his knee.  It is unlikely that he would benefit from surgery.  Discussed options of return to physical therapy, continued participation in home exercise program, alternate NSAIDs, intra-articular steroid injection, or viscosupplementation.  Patient elects to proceed with right knee steroid injection, administered in office today.  Advised on duration between injections.  Work note provided.  Follow-up in 4 weeks.  Eval readiness for return to work.

## 2023-10-23 ENCOUNTER — APPOINTMENT (OUTPATIENT)
Dept: GENERAL RADIOLOGY | Facility: HOSPITAL | Age: 38
End: 2023-10-23
Payer: COMMERCIAL

## 2023-10-23 ENCOUNTER — HOSPITAL ENCOUNTER (OUTPATIENT)
Facility: HOSPITAL | Age: 38
Setting detail: OBSERVATION
Discharge: HOME | End: 2023-10-25
Attending: EMERGENCY MEDICINE | Admitting: STUDENT IN AN ORGANIZED HEALTH CARE EDUCATION/TRAINING PROGRAM
Payer: COMMERCIAL

## 2023-10-23 DIAGNOSIS — R07.9 CHEST PAIN, UNSPECIFIED TYPE: Primary | ICD-10-CM

## 2023-10-23 DIAGNOSIS — I20.0 UNSTABLE ANGINA: ICD-10-CM

## 2023-10-23 LAB
ALBUMIN SERPL-MCNC: 4.1 G/DL (ref 3.5–5.2)
ALBUMIN/GLOB SERPL: 1.6 G/DL
ALP SERPL-CCNC: 101 U/L (ref 39–117)
ALT SERPL W P-5'-P-CCNC: 22 U/L (ref 1–41)
ANION GAP SERPL CALCULATED.3IONS-SCNC: 10.7 MMOL/L (ref 5–15)
AST SERPL-CCNC: 10 U/L (ref 1–40)
BASOPHILS # BLD AUTO: 0.06 10*3/MM3 (ref 0–0.2)
BASOPHILS NFR BLD AUTO: 0.4 % (ref 0–1.5)
BILIRUB SERPL-MCNC: 0.5 MG/DL (ref 0–1.2)
BUN SERPL-MCNC: 14 MG/DL (ref 6–20)
BUN/CREAT SERPL: 17.5 (ref 7–25)
CALCIUM SPEC-SCNC: 8.6 MG/DL (ref 8.6–10.5)
CHLORIDE SERPL-SCNC: 103 MMOL/L (ref 98–107)
CO2 SERPL-SCNC: 22.3 MMOL/L (ref 22–29)
CREAT SERPL-MCNC: 0.8 MG/DL (ref 0.76–1.27)
DEPRECATED RDW RBC AUTO: 41.2 FL (ref 37–54)
EGFRCR SERPLBLD CKD-EPI 2021: 118.4 ML/MIN/1.73
EOSINOPHIL # BLD AUTO: 0.2 10*3/MM3 (ref 0–0.4)
EOSINOPHIL NFR BLD AUTO: 1.5 % (ref 0.3–6.2)
ERYTHROCYTE [DISTWIDTH] IN BLOOD BY AUTOMATED COUNT: 13.3 % (ref 12.3–15.4)
GEN 5 2HR TROPONIN T REFLEX: 18 NG/L
GLOBULIN UR ELPH-MCNC: 2.5 GM/DL
GLUCOSE SERPL-MCNC: 165 MG/DL (ref 65–99)
HCT VFR BLD AUTO: 43.3 % (ref 37.5–51)
HGB BLD-MCNC: 14.8 G/DL (ref 13–17.7)
HOLD SPECIMEN: NORMAL
HOLD SPECIMEN: NORMAL
IMM GRANULOCYTES # BLD AUTO: 0.11 10*3/MM3 (ref 0–0.05)
IMM GRANULOCYTES NFR BLD AUTO: 0.8 % (ref 0–0.5)
LIPASE SERPL-CCNC: 27 U/L (ref 13–60)
LYMPHOCYTES # BLD AUTO: 2.57 10*3/MM3 (ref 0.7–3.1)
LYMPHOCYTES NFR BLD AUTO: 19 % (ref 19.6–45.3)
MAGNESIUM SERPL-MCNC: 2 MG/DL (ref 1.6–2.6)
MCH RBC QN AUTO: 29.5 PG (ref 26.6–33)
MCHC RBC AUTO-ENTMCNC: 34.2 G/DL (ref 31.5–35.7)
MCV RBC AUTO: 86.4 FL (ref 79–97)
MONOCYTES # BLD AUTO: 0.8 10*3/MM3 (ref 0.1–0.9)
MONOCYTES NFR BLD AUTO: 5.9 % (ref 5–12)
NEUTROPHILS NFR BLD AUTO: 72.4 % (ref 42.7–76)
NEUTROPHILS NFR BLD AUTO: 9.82 10*3/MM3 (ref 1.7–7)
NRBC BLD AUTO-RTO: 0 /100 WBC (ref 0–0.2)
NT-PROBNP SERPL-MCNC: <36 PG/ML (ref 0–450)
PLATELET # BLD AUTO: 283 10*3/MM3 (ref 140–450)
PMV BLD AUTO: 9.5 FL (ref 6–12)
POTASSIUM SERPL-SCNC: 3.5 MMOL/L (ref 3.5–5.2)
PROT SERPL-MCNC: 6.6 G/DL (ref 6–8.5)
RBC # BLD AUTO: 5.01 10*6/MM3 (ref 4.14–5.8)
SODIUM SERPL-SCNC: 136 MMOL/L (ref 136–145)
TROPONIN T DELTA: 9 NG/L
TROPONIN T SERPL HS-MCNC: 26 NG/L
TROPONIN T SERPL HS-MCNC: 9 NG/L
WBC NRBC COR # BLD: 13.56 10*3/MM3 (ref 3.4–10.8)
WHOLE BLOOD HOLD COAG: NORMAL
WHOLE BLOOD HOLD SPECIMEN: NORMAL

## 2023-10-23 PROCEDURE — 93005 ELECTROCARDIOGRAM TRACING: CPT

## 2023-10-23 PROCEDURE — 84484 ASSAY OF TROPONIN QUANT: CPT | Performed by: INTERNAL MEDICINE

## 2023-10-23 PROCEDURE — 96374 THER/PROPH/DIAG INJ IV PUSH: CPT

## 2023-10-23 PROCEDURE — 94799 UNLISTED PULMONARY SVC/PX: CPT

## 2023-10-23 PROCEDURE — 96361 HYDRATE IV INFUSION ADD-ON: CPT

## 2023-10-23 PROCEDURE — 83735 ASSAY OF MAGNESIUM: CPT | Performed by: EMERGENCY MEDICINE

## 2023-10-23 PROCEDURE — 96372 THER/PROPH/DIAG INJ SC/IM: CPT

## 2023-10-23 PROCEDURE — 96376 TX/PRO/DX INJ SAME DRUG ADON: CPT

## 2023-10-23 PROCEDURE — G0378 HOSPITAL OBSERVATION PER HR: HCPCS

## 2023-10-23 PROCEDURE — 71045 X-RAY EXAM CHEST 1 VIEW: CPT

## 2023-10-23 PROCEDURE — 25010000002 ENOXAPARIN PER 10 MG: Performed by: INTERNAL MEDICINE

## 2023-10-23 PROCEDURE — 93010 ELECTROCARDIOGRAM REPORT: CPT | Performed by: INTERNAL MEDICINE

## 2023-10-23 PROCEDURE — 99284 EMERGENCY DEPT VISIT MOD MDM: CPT

## 2023-10-23 PROCEDURE — 83880 ASSAY OF NATRIURETIC PEPTIDE: CPT | Performed by: EMERGENCY MEDICINE

## 2023-10-23 PROCEDURE — 94761 N-INVAS EAR/PLS OXIMETRY MLT: CPT

## 2023-10-23 PROCEDURE — 84484 ASSAY OF TROPONIN QUANT: CPT | Performed by: EMERGENCY MEDICINE

## 2023-10-23 PROCEDURE — 25810000003 SODIUM CHLORIDE 0.9 % SOLUTION: Performed by: INTERNAL MEDICINE

## 2023-10-23 PROCEDURE — 93005 ELECTROCARDIOGRAM TRACING: CPT | Performed by: EMERGENCY MEDICINE

## 2023-10-23 PROCEDURE — 25010000002 MORPHINE PER 10 MG: Performed by: EMERGENCY MEDICINE

## 2023-10-23 PROCEDURE — 25010000002 MORPHINE PER 10 MG: Performed by: INTERNAL MEDICINE

## 2023-10-23 PROCEDURE — 36415 COLL VENOUS BLD VENIPUNCTURE: CPT | Performed by: INTERNAL MEDICINE

## 2023-10-23 PROCEDURE — 83690 ASSAY OF LIPASE: CPT | Performed by: EMERGENCY MEDICINE

## 2023-10-23 PROCEDURE — 85025 COMPLETE CBC W/AUTO DIFF WBC: CPT

## 2023-10-23 PROCEDURE — 80053 COMPREHEN METABOLIC PANEL: CPT | Performed by: EMERGENCY MEDICINE

## 2023-10-23 RX ORDER — ONDANSETRON 4 MG/1
4 TABLET, FILM COATED ORAL EVERY 6 HOURS PRN
Status: DISCONTINUED | OUTPATIENT
Start: 2023-10-23 | End: 2023-10-25 | Stop reason: HOSPADM

## 2023-10-23 RX ORDER — NITROGLYCERIN 0.4 MG/1
0.4 TABLET SUBLINGUAL
Status: DISCONTINUED | OUTPATIENT
Start: 2023-10-23 | End: 2023-10-23

## 2023-10-23 RX ORDER — ACETAMINOPHEN 500 MG
1000 TABLET ORAL EVERY 6 HOURS PRN
Status: DISCONTINUED | OUTPATIENT
Start: 2023-10-23 | End: 2023-10-25 | Stop reason: HOSPADM

## 2023-10-23 RX ORDER — ASPIRIN 81 MG/1
324 TABLET, CHEWABLE ORAL ONCE
Status: COMPLETED | OUTPATIENT
Start: 2023-10-23 | End: 2023-10-23

## 2023-10-23 RX ORDER — BISACODYL 10 MG
10 SUPPOSITORY, RECTAL RECTAL DAILY PRN
Status: DISCONTINUED | OUTPATIENT
Start: 2023-10-23 | End: 2023-10-25 | Stop reason: HOSPADM

## 2023-10-23 RX ORDER — ONDANSETRON 2 MG/ML
4 INJECTION INTRAMUSCULAR; INTRAVENOUS EVERY 6 HOURS PRN
Status: DISCONTINUED | OUTPATIENT
Start: 2023-10-23 | End: 2023-10-25 | Stop reason: HOSPADM

## 2023-10-23 RX ORDER — AMOXICILLIN 250 MG
2 CAPSULE ORAL 2 TIMES DAILY
Status: DISCONTINUED | OUTPATIENT
Start: 2023-10-23 | End: 2023-10-25 | Stop reason: HOSPADM

## 2023-10-23 RX ORDER — SODIUM CHLORIDE 0.9 % (FLUSH) 0.9 %
10 SYRINGE (ML) INJECTION AS NEEDED
Status: DISCONTINUED | OUTPATIENT
Start: 2023-10-23 | End: 2023-10-25 | Stop reason: HOSPADM

## 2023-10-23 RX ORDER — SODIUM CHLORIDE 9 MG/ML
100 INJECTION, SOLUTION INTRAVENOUS CONTINUOUS
Status: DISCONTINUED | OUTPATIENT
Start: 2023-10-23 | End: 2023-10-25 | Stop reason: HOSPADM

## 2023-10-23 RX ORDER — CHOLECALCIFEROL (VITAMIN D3) 125 MCG
5 CAPSULE ORAL NIGHTLY PRN
Status: DISCONTINUED | OUTPATIENT
Start: 2023-10-23 | End: 2023-10-25 | Stop reason: HOSPADM

## 2023-10-23 RX ORDER — NALOXONE HCL 0.4 MG/ML
0.4 VIAL (ML) INJECTION
Status: DISCONTINUED | OUTPATIENT
Start: 2023-10-23 | End: 2023-10-25 | Stop reason: HOSPADM

## 2023-10-23 RX ORDER — BISACODYL 5 MG/1
5 TABLET, DELAYED RELEASE ORAL DAILY PRN
Status: DISCONTINUED | OUTPATIENT
Start: 2023-10-23 | End: 2023-10-25 | Stop reason: HOSPADM

## 2023-10-23 RX ORDER — SODIUM CHLORIDE 9 MG/ML
40 INJECTION, SOLUTION INTRAVENOUS AS NEEDED
Status: DISCONTINUED | OUTPATIENT
Start: 2023-10-23 | End: 2023-10-25 | Stop reason: HOSPADM

## 2023-10-23 RX ORDER — MORPHINE SULFATE 2 MG/ML
2 INJECTION, SOLUTION INTRAMUSCULAR; INTRAVENOUS
Status: DISCONTINUED | OUTPATIENT
Start: 2023-10-23 | End: 2023-10-25 | Stop reason: HOSPADM

## 2023-10-23 RX ORDER — POLYETHYLENE GLYCOL 3350 17 G/17G
17 POWDER, FOR SOLUTION ORAL DAILY PRN
Status: DISCONTINUED | OUTPATIENT
Start: 2023-10-23 | End: 2023-10-25 | Stop reason: HOSPADM

## 2023-10-23 RX ORDER — ENOXAPARIN SODIUM 100 MG/ML
40 INJECTION SUBCUTANEOUS EVERY 24 HOURS
Status: DISCONTINUED | OUTPATIENT
Start: 2023-10-23 | End: 2023-10-25 | Stop reason: HOSPADM

## 2023-10-23 RX ORDER — SODIUM CHLORIDE 0.9 % (FLUSH) 0.9 %
10 SYRINGE (ML) INJECTION EVERY 12 HOURS SCHEDULED
Status: DISCONTINUED | OUTPATIENT
Start: 2023-10-23 | End: 2023-10-25 | Stop reason: HOSPADM

## 2023-10-23 RX ADMIN — MORPHINE SULFATE 4 MG: 4 INJECTION, SOLUTION INTRAMUSCULAR; INTRAVENOUS at 12:03

## 2023-10-23 RX ADMIN — ASPIRIN 81 MG CHEWABLE TABLET 324 MG: 81 TABLET CHEWABLE at 10:07

## 2023-10-23 RX ADMIN — MORPHINE SULFATE 2 MG: 2 INJECTION, SOLUTION INTRAMUSCULAR; INTRAVENOUS at 19:22

## 2023-10-23 RX ADMIN — NITROGLYCERIN 0.4 MG: 0.4 TABLET, ORALLY DISINTEGRATING SUBLINGUAL at 10:54

## 2023-10-23 RX ADMIN — ENOXAPARIN SODIUM 40 MG: 100 INJECTION SUBCUTANEOUS at 16:01

## 2023-10-23 RX ADMIN — Medication 10 ML: at 19:23

## 2023-10-23 RX ADMIN — NITROGLYCERIN 1 INCH: 20 OINTMENT TOPICAL at 13:45

## 2023-10-23 RX ADMIN — NITROGLYCERIN 1 INCH: 20 OINTMENT TOPICAL at 23:31

## 2023-10-23 RX ADMIN — NITROGLYCERIN 0.4 MG: 0.4 TABLET, ORALLY DISINTEGRATING SUBLINGUAL at 11:00

## 2023-10-23 RX ADMIN — NITROGLYCERIN 1 INCH: 20 OINTMENT TOPICAL at 18:02

## 2023-10-23 RX ADMIN — MORPHINE SULFATE 4 MG: 4 INJECTION, SOLUTION INTRAMUSCULAR; INTRAVENOUS at 13:45

## 2023-10-23 RX ADMIN — SODIUM CHLORIDE 100 ML/HR: 9 INJECTION, SOLUTION INTRAVENOUS at 23:30

## 2023-10-23 RX ADMIN — SODIUM CHLORIDE 100 ML/HR: 9 INJECTION, SOLUTION INTRAVENOUS at 16:02

## 2023-10-23 NOTE — H&P
[facility]  [facility]: [facility]   Patient Name: Deo Pacheco  : 1985  MRN: [id number]  Primary Care Physician:  Blanco Angelo MD  Date of admission: 10/23/2023    Subjective   Subjective     Chief Complaint: Chest pain    HPI:    Deo Pacheco is a 37 y.o. male past medical history frequent for coronary artery disease that is post stent placement, and hypertension  Scented to the emergency department on this morning with complaints of chest pain which she describes as a pressure and occasionally stabbing type of pain in his left chest area radiating to his left neck.  He reports shortness of breath and diaphoresis but no nausea or vomiting.  As of his symptoms he presented to the emergency department.  He received sublingual nitro with minimal improvement in his symptoms and then subsequently received morphine and nitro patch and at the time of my evaluation he reports pain is resolved.    Initial presentation emergency department temperature is noted to be 98.1 pulse 70 respirations 18 blood pressure 148/103 pulse ox 99% on unknown flow of oxygen.  Labs showed initial troponin of 9 with a repeat of 18.  CBC with a WBC of 13.5, CMP with glucose of 165    Personal History     Past Medical History:  Past Medical History:   Diagnosis Date    Coronary artery disease     FOLLOWS W/CEDILLO, NO CP OR SOA    Epilepsy with status epilepticus     NO ISSUES SINCE    Hypertension     Knee pain     LEFT    Knee sprain \\23    Myocardial infarction 2019    STENT PLACED       Past Surgical History:  Past Surgical History:   Procedure Laterality Date    CARDIAC CATHETERIZATION      CORONARY ANGIOPLASTY WITH STENT PLACEMENT Right 2019    KIDNEY STONE SURGERY      KNEE ARTHROSCOPY W/ MENISCECTOMY Left 6/15/2023    Procedure: KNEE ARTHROSCOPY WITH PARTIAL LATERAL MENISCECTOMY;  Surgeon: Suraj Nair MD;  [facility]: [facility];  Service: Orthopedics;   Laterality: Left;       Family History:   Family History   Problem Relation Age of Onset    Cancer Mother     Diabetes Father     Cancer Father        Social History:   Social History     Socioeconomic History    Marital status: Significant Other   Tobacco Use    Smoking status: Every Day     Packs/day: 0.50     Years: 10.00     Additional pack years: 0.00     Total pack years: 5.00     Types: Cigarettes     Start date: 1/1/2007    Smokeless tobacco: Never   Vaping Use    Vaping Use: Never used   Substance and Sexual Activity    Alcohol use: Never    Drug use: Never    Sexual activity: Defer       Home Medications:  amLODIPine, aspirin, atorvastatin, buPROPion SR, isosorbide mononitrate, losartan, nitroglycerin, and ranolazine    Allergies:  No Known Allergies    Review of Systems   All systems were reviewed and negative except for: As noted in i HPI.  Objective   Objective     Vitals:   Temp:  [97.7 °F (36.5 °C)-98.1 °F (36.7 °C)] 97.7 °F (36.5 °C)  Heart Rate:  [56-70] 60  Resp:  [18] 18  BP: ()/() 119/75    Physical Exam    Constitutional: Awake, alert, no acute distress   Eyes: Pupils equal, sclerae anicteric, no conjunctival injection   HENT: NCAT, mucous membranes moist   Neck: Supple, no thyromegaly, no lymphadenopathy, trachea midline   Respiratory: Clear to auscultation bilaterally, nonlabored respirations    Cardiovascular: Bradycardic S1-S2, no appreciable murmurs, rubs,  palpable pedal pulses bilaterally   Gastrointestinal: Positive bowel sounds, soft, nontender, nondistended   Musculoskeletal: No bilateral ankle edema, no clubbing or cyanosis to extremities   Psychiatric: Appropriate affect, cooperative   Neurologic: Oriented x 3, moving all extremities equally-no focal weakness appreciated, Cranial Nerves grossly intact, speech is clear Skin: No rashes     Result Review    Result Review:  I have personally reviewed the results from the time of this admission to 10/23/2023 17:20 EDT and  agree with these findings:  [x]  Laboratory  []  Microbiology  [x]  Radiology  [x]  EKG/Telemetry   []  Cardiology/Vascular   []  Pathology  [x]  Old records  []  Other:      Assessment & Plan   Assessment / Plan     Assessment/Plan:   Angina pectoris  History of coronary artery disease post stent placement  Hypertension  Dyslipidemia  Hyperglycemia  Tobacco abuse      Admit to the hospital service for further evaluation and treatment.  Monitor on PCU  Complete cardiac enzyme trend.  Continue pain management with topical nitro and morphine as needed.  Cardiology consulted from the emergency department will await further recommendations.  Check fasting lipid profile in a.m.  Check fasting blood glucose BMP and hemoglobin A1c in the a.m.  Home medications will be reviewed and resumed as clinically indicated.    DVT prophylaxis:  Medical DVT prophylaxis orders are present.    CODE STATUS:    Level Of Support Discussed With: Patient  Code Status (Patient has no pulse and is not breathing): CPR (Attempt to Resuscitate)  Medical Interventions (Patient has pulse or is breathing): Full Support      Admission Status:  I believe this patient meets observation status.    Electronically signed by Rosanne Banks MD, 10/23/23, 5:20 PM EDT.

## 2023-10-23 NOTE — Clinical Note
First balloon inflation max pressure = 12 yolande. First balloon inflation duration = 8 seconds. Second inflation of balloon - Max pressure = 12 yolande. 2nd Inflation of balloon - Duration = 8 seconds.

## 2023-10-23 NOTE — ED PROVIDER NOTES
Time: 12:54 PM EDT  Date of encounter:  10/23/2023  Independent Historian/Clinical History and Information was obtained by:   Patient    History is limited by: N/A    Chief Complaint: Chest pain      History of Present Illness:  Patient is a 37 y.o. year old male who presents to the emergency department for evaluation of chest pain.  Patient reports a history of CAD with 2 stents in place and complains of onset of chest pain.  Denies nausea, vomiting, shortness of breath, diaphoresis.    HPI    Patient Care Team  Primary Care Provider: Blanco Angelo MD    Past Medical History:     No Known Allergies  Past Medical History:   Diagnosis Date    Coronary artery disease     FOLLOWS W/MAMADOU, NO CP OR SOA    Epilepsy with status epilepticus 2015    NO ISSUES SINCE    Hypertension     Knee pain     LEFT    Knee sprain 2\28\23    Myocardial infarction 2019    STENT PLACED     Past Surgical History:   Procedure Laterality Date    CARDIAC CATHETERIZATION      CORONARY ANGIOPLASTY WITH STENT PLACEMENT Right 2019    KIDNEY STONE SURGERY      KNEE ARTHROSCOPY W/ MENISCECTOMY Left 6/15/2023    Procedure: KNEE ARTHROSCOPY WITH PARTIAL LATERAL MENISCECTOMY;  Surgeon: Suraj Nair MD;  [place] [facility] [place] [facility] [place]  Service: Orthopedics;  Laterality: Left;     Family History   Problem Relation Age of Onset    Cancer Mother     Diabetes Father     Cancer Father        Home Medications:  Prior to Admission medications    Medication Sig Start Date End Date Taking? Authorizing Provider   amLODIPine (NORVASC) 5 MG tablet Take 1 tablet by mouth Daily.    ProviderShayne MD   aspirin (aspirin) 81 MG EC tablet Take 1 tablet by mouth Daily.  Patient taking differently: Take 1 tablet by mouth Daily. CONTINUE  PREOP PER  CARDIOLOGIST 4/20/22   TABITHA Bullock MD   atorvastatin (LIPITOR) 40 MG tablet Take 1 tablet by mouth Every Night.    ProviderShayne MD   buPROPion SR (WELLBUTRIN SR) 150 MG 12 hr tablet Take 1 tablet by mouth  Daily. 3/1/23   ProviderShayne MD   diclofenac (VOLTAREN) 75 MG EC tablet Take 1 tablet by mouth 2 (Two) Times a Day. 3/20/23   Suraj Nair MD   HYDROcodone-acetaminophen (Norco) 7.5-325 MG per tablet Take 1 tablet by mouth Every 4 (Four) Hours As Needed for Moderate Pain. 6/15/23   Suraj Nair MD   Ibuprofen 3 %, Gabapentin 10 %, Baclofen 2 %, lidocaine 2 %, Ketamine HCl 10 % Apply 1-2 g topically to the appropriate area as directed 3 (Three) to 4 (Four) times daily. 9/1/23 8/31/24  Suraj Nair MD   isosorbide mononitrate (IMDUR) 30 MG 24 hr tablet Take 1 tablet by mouth Daily. 4/20/22   TABITHA Bullock MD   losartan (COZAAR) 100 MG tablet Take 1 tablet by mouth Daily.    Provider, MD Shayne   nitroglycerin (Nitrostat) 0.4 MG SL tablet Place 1 tablet under the tongue Every 5 (Five) Minutes As Needed for Chest Pain. Take no more than 3 doses in 15 minutes. 4/20/22   Pramod Garcia DO   ranolazine (RANEXA) 500 MG 12 hr tablet Take 1 tablet by mouth Every 12 (Twelve) Hours. 4/20/22   TABITHA Bullock MD        Social History:   Social History     Tobacco Use    Smoking status: Every Day     Packs/day: 0.50     Years: 10.00     Additional pack years: 0.00     Total pack years: 5.00     Types: Cigarettes     Start date: 1/1/2007    Smokeless tobacco: Never   Vaping Use    Vaping Use: Never used   Substance Use Topics    Alcohol use: Never    Drug use: Never         Review of Systems:  Review of Systems   Constitutional:  Negative for chills and fever.   HENT:  Negative for congestion, rhinorrhea and sore throat.    Eyes:  Negative for pain and visual disturbance.   Respiratory:  Negative for apnea, cough, chest tightness and shortness of breath.    Cardiovascular:  Positive for chest pain. Negative for palpitations.   Gastrointestinal:  Negative for abdominal pain, diarrhea, nausea and vomiting.   Genitourinary:  Negative for difficulty urinating and dysuria.   Musculoskeletal:  Negative for  "joint swelling and myalgias.   Skin:  Negative for color change.   Neurological:  Negative for seizures and headaches.   Psychiatric/Behavioral: Negative.     All other systems reviewed and are negative.       Physical Exam:  /86   Pulse 63   Temp 98.1 °F (36.7 °C)   Resp 18   Ht 170.2 cm (67\")   Wt 117 kg (257 lb 15 oz)   SpO2 94%   BMI 40.40 kg/m²     Physical Exam  Vitals and nursing note reviewed.   Constitutional:       General: He is not in acute distress.     Appearance: Normal appearance. He is not toxic-appearing.   HENT:      Head: Normocephalic and atraumatic.      Jaw: There is normal jaw occlusion.   Eyes:      General: Lids are normal.      Extraocular Movements: Extraocular movements intact.      Conjunctiva/sclera: Conjunctivae normal.      Pupils: Pupils are equal, round, and reactive to light.   Cardiovascular:      Rate and Rhythm: Normal rate and regular rhythm.      Pulses: Normal pulses.      Heart sounds: Normal heart sounds.   Pulmonary:      Effort: Pulmonary effort is normal. No respiratory distress.      Breath sounds: Normal breath sounds. No wheezing or rhonchi.   Abdominal:      General: Abdomen is flat.      Palpations: Abdomen is soft.      Tenderness: There is no abdominal tenderness. There is no guarding or rebound.   Musculoskeletal:         General: Normal range of motion.      Cervical back: Normal range of motion and neck supple.      Right lower leg: No edema.      Left lower leg: No edema.   Skin:     General: Skin is warm and dry.   Neurological:      Mental Status: He is alert and oriented to person, place, and time. Mental status is at baseline.   Psychiatric:         Mood and Affect: Mood normal.                  Procedures:  Procedures      Medical Decision Making:      Comorbidities that affect care:    Coronary Artery Disease, Hypertension    External Notes reviewed:    None      The following orders were placed and all results were independently analyzed " by me:  Orders Placed This Encounter   Procedures    XR Chest 1 View    [place] Draw    High Sensitivity Troponin T    Comprehensive Metabolic Panel    Lipase    BNP    Magnesium    CBC Auto Differential    High Sensitivity Troponin T 2Hr    NPO Diet NPO Type: Strict NPO    Undress & Gown    Continuous Pulse Oximetry    Inpatient Hospitalist Consult    Cardiology (on-call MD unless specified)    Oxygen Therapy- Nasal Cannula; Titrate 1-6 LPM Per SpO2; 90 - 95%    ECG 12 Lead ED Triage Standing Order; Chest Pain    ECG 12 Lead ED Triage Standing Order; Chest Pain    Insert Peripheral IV    CBC & Differential    Green Top (Gel)    Lavender Top    Gold Top - SST    Light Blue Top       Medications Given in the Emergency Department:  Medications   sodium chloride 0.9 % flush 10 mL (has no administration in time range)   nitroglycerin (NITROSTAT) SL tablet 0.4 mg (0.4 mg Sublingual Given 10/23/23 1100)   aspirin chewable tablet 324 mg (324 mg Oral Given 10/23/23 1007)   morphine injection 4 mg (4 mg Intravenous Given 10/23/23 1203)   nitroglycerin (NITROSTAT) ointment 1 inch (1 inch Topical Given 10/23/23 1345)   morphine injection 4 mg (4 mg Intravenous Given 10/23/23 1345)        ED Course:    ED Course as of 10/23/23 1349   Mon Oct 23, 2023   1326 My interpretation of the EKG shows sinus rhythm, rate of 52 bpm, no acute ischemia, normal QT [TC]      ED Course User Index  [TC] Carl Lemus MD       Labs:    Lab Results (last 24 hours)       Procedure Component Value Units Date/Time    High Sensitivity Troponin T [[id number]]  (Normal) Collected: 10/23/23 1005    Specimen: Blood Updated: 10/23/23 1039     HS Troponin T 9 ng/L     Narrative:      High Sensitive Troponin T Reference Range:  <10.0 ng/L- Negative Female for AMI  <15.0 ng/L- Negative Male for AMI  >=10 - Abnormal Female indicating possible myocardial injury.  >=15 - Abnormal Male indicating possible myocardial injury.   Clinicians would have to utilize  clinical acumen, EKG, Troponin, and serial changes to determine if it is an Acute Myocardial Infarction or myocardial injury due to an underlying chronic condition.         CBC & Differential [[id number]]  (Abnormal) Collected: 10/23/23 1005    Specimen: Blood Updated: 10/23/23 1014    Narrative:      The following orders were created for panel order CBC & Differential.  Procedure                               Abnormality         Status                     ---------                               -----------         ------                     CBC Auto Differential[[id number]]        Abnormal            Final result                 Please view results for these tests on the individual orders.    Comprehensive Metabolic Panel [[id number]]  (Abnormal) Collected: 10/23/23 1005    Specimen: Blood Updated: 10/23/23 1039     Glucose 165 mg/dL      BUN 14 mg/dL      Creatinine 0.80 mg/dL      Sodium 136 mmol/L      Potassium 3.5 mmol/L      Chloride 103 mmol/L      CO2 22.3 mmol/L      Calcium 8.6 mg/dL      Total Protein 6.6 g/dL      Albumin 4.1 g/dL      ALT (SGPT) 22 U/L      AST (SGOT) 10 U/L      Alkaline Phosphatase 101 U/L      Total Bilirubin 0.5 mg/dL      Globulin 2.5 gm/dL      A/G Ratio 1.6 g/dL      BUN/Creatinine Ratio 17.5     Anion Gap 10.7 mmol/L      eGFR 118.4 mL/min/1.73     Narrative:      GFR Normal >60  Chronic Kidney Disease <60  Kidney Failure <15      Lipase [[id number]]  (Normal) Collected: 10/23/23 1005    Specimen: Blood Updated: 10/23/23 1039     Lipase 27 U/L     BNP [[id number]]  (Normal) Collected: 10/23/23 1005    Specimen: Blood Updated: 10/23/23 1034     proBNP <36.0 pg/mL     Narrative:      This assay is used as an aid in the diagnosis of individuals suspected of having heart failure. It can be used as an aid in the diagnosis of acute decompensated heart failure (ADHF) in patients presenting with signs and symptoms of ADHF to the emergency department (ED). In addition, NT-proBNP of <300  pg/mL indicates ADHF is not likely.    Age Range Result Interpretation  NT-proBNP Concentration (pg/mL:      <50             Positive            >450                   Gray                 300-450                    Negative             <300    50-75           Positive            >900                  Gray                300-900                  Negative            <300      >75             Positive            >1800                  Gray                300-1800                  Negative            <300    Magnesium [[id number]]  (Normal) Collected: 10/23/23 1005    Specimen: Blood Updated: 10/23/23 1039     Magnesium 2.0 mg/dL     CBC Auto Differential [[id number]]  (Abnormal) Collected: 10/23/23 1005    Specimen: Blood Updated: 10/23/23 1014     WBC 13.56 10*3/mm3      RBC 5.01 10*6/mm3      Hemoglobin 14.8 g/dL      Hematocrit 43.3 %      MCV 86.4 fL      MCH 29.5 pg      MCHC 34.2 g/dL      RDW 13.3 %      RDW-SD 41.2 fl      MPV 9.5 fL      Platelets 283 10*3/mm3      Neutrophil % 72.4 %      Lymphocyte % 19.0 %      Monocyte % 5.9 %      Eosinophil % 1.5 %      Basophil % 0.4 %      Immature Grans % 0.8 %      Neutrophils, Absolute 9.82 10*3/mm3      Lymphocytes, Absolute 2.57 10*3/mm3      Monocytes, Absolute 0.80 10*3/mm3      Eosinophils, Absolute 0.20 10*3/mm3      Basophils, Absolute 0.06 10*3/mm3      Immature Grans, Absolute 0.11 10*3/mm3      nRBC 0.0 /100 WBC     High Sensitivity Troponin T 2Hr [[id number]]  (Abnormal) Collected: 10/23/23 1203    Specimen: Blood Updated: 10/23/23 1255     HS Troponin T 18 ng/L      Troponin T Delta 9 ng/L     Narrative:      High Sensitive Troponin T Reference Range:  <10.0 ng/L- Negative Female for AMI  <15.0 ng/L- Negative Male for AMI  >=10 - Abnormal Female indicating possible myocardial injury.  >=15 - Abnormal Male indicating possible myocardial injury.   Clinicians would have to utilize clinical acumen, EKG, Troponin, and serial changes to determine if it is an  Acute Myocardial Infarction or myocardial injury due to an underlying chronic condition.                  Imaging:    XR Chest 1 View    Result Date: 10/23/2023  PROCEDURE: XR CHEST 1 VW  COMPARISON: [facility], CR, XR CHEST 1 VW, 4/19/2022, 12:35.  INDICATIONS: Chest Pain Triage Protocol  FINDINGS:  The heart appears mildly enlarged.  Mediastinal contour appears within normal limits.  No significant effusion, pneumothorax, or pulmonary infiltrate.        1. Mild cardiomegaly. 2. No radiographic findings of acute pulmonary abnormality.       ANTONIETA DIAS MD       Electronically Signed and Approved By: ANTONIETA DIAS MD on 10/23/2023 at 10:20                Differential Diagnosis and Discussion:    Chest Pain:  Based on the patient's signs and symptoms, I considered aortic dissection, myocardial infaction, pulmonary embolism, cardiac tamponade, pericarditis, pneumothorax, musculoskeletal chest pain and other differential diagnosis as an etiology of the patient's chest pain.     All labs were reviewed and interpreted by me.  All X-rays impressions were independently interpreted by me.  EKG was interpreted by me.    [facility]           Patient Care Considerations:    PERC: I used the PERC score to risk stratify the patient for PE and a CT of the chest was considered but ultimately not indicated in today's visit.      Consultants/Shared Management Plan:    Hospitalist: I have discussed the case with Dr. Banks who agrees to accept the patient for admission.  Consultant: I have discussed the case with cardiology who agrees to consult on the patient.    Social Determinants of Health:    Patient is independent, reliable, and has access to care.       Disposition and Care Coordination:    Admit:   Through independent evaluation of the patient's history, physical, and imperical data, the patient meets criteria for observation/admission to the hospital.    I have explained the patient´s condition, diagnoses and  treatment plan based on the information available to me at this time. I have answered questions and addressed any concerns. The patient has a good  understanding of the patient´s diagnosis, condition, and treatment plan as can be expected at this point. The vital signs have been stable. The patient´s condition is stable and appropriate for discharge from the emergency department.      The patient will pursue further outpatient evaluation with the primary care physician or other designated or consulting physician as outlined in the discharge instructions. They are agreeable to this plan of care and follow-up instructions have been explained in detail. The patient has received these instructions in written format and have expressed an understanding of the discharge instructions. The patient is aware that any significant change in condition or worsening of symptoms should prompt an immediate return to this or the closest emergency department or call to 911.  I have explained discharge medications and the need for follow up with the patient/caretakers. This was also printed in the discharge instructions. Patient was discharged with the following medications and follow up:      Medication List      No changes were made to your prescriptions during this visit.      No follow-up provider specified.     Final diagnoses:   Chest pain, unspecified type        ED Disposition       ED Disposition   Decision to Admit    Condition   --    Comment   --               This medical record created using voice recognition software.             Carl Lemus MD  10/23/23 7767

## 2023-10-23 NOTE — Clinical Note
First balloon inflation max pressure = 18 yolande. First balloon inflation duration = 10 seconds. Second inflation of balloon - Max pressure = 18 yolande. 2nd Inflation of balloon - Duration = 7 seconds. Third inflation of balloon - Max pressure = 20 yolande. 3rd Inflation of balloon - Duration = 10 seconds.

## 2023-10-23 NOTE — Clinical Note
First balloon inflation max pressure = 12 yolande. First balloon inflation duration = 6 seconds. Second inflation of balloon - Max pressure = 12 yolande. 2nd Inflation of balloon - Duration = 6 seconds. Third inflation of balloon - Max pressure = 12 yolande. 3rd Inflation of balloon - Duration = 6 seconds. Fourth inflation of balloon - Max pressure = 12 yolande. 4th Inflation of balloon - Duration = 10 seconds.

## 2023-10-24 PROBLEM — R07.9 CHEST PAIN: Status: RESOLVED | Noted: 2022-04-19 | Resolved: 2023-10-24

## 2023-10-24 PROBLEM — I20.0 UNSTABLE ANGINA: Status: ACTIVE | Noted: 2023-10-24

## 2023-10-24 LAB
ACT BLD: 245 SECONDS (ref 89–137)
ANION GAP SERPL CALCULATED.3IONS-SCNC: 11.5 MMOL/L (ref 5–15)
BUN SERPL-MCNC: 15 MG/DL (ref 6–20)
BUN/CREAT SERPL: 19.5 (ref 7–25)
CALCIUM SPEC-SCNC: 8.1 MG/DL (ref 8.6–10.5)
CHLORIDE SERPL-SCNC: 104 MMOL/L (ref 98–107)
CHOLEST SERPL-MCNC: 143 MG/DL (ref 0–200)
CO2 SERPL-SCNC: 21.5 MMOL/L (ref 22–29)
CREAT SERPL-MCNC: 0.77 MG/DL (ref 0.76–1.27)
DEPRECATED RDW RBC AUTO: 42.4 FL (ref 37–54)
EGFRCR SERPLBLD CKD-EPI 2021: 119.7 ML/MIN/1.73
ERYTHROCYTE [DISTWIDTH] IN BLOOD BY AUTOMATED COUNT: 13.2 % (ref 12.3–15.4)
GLUCOSE SERPL-MCNC: 204 MG/DL (ref 65–99)
HBA1C MFR BLD: 7 % (ref 4.8–5.6)
HCT VFR BLD AUTO: 41 % (ref 37.5–51)
HDLC SERPL-MCNC: 36 MG/DL (ref 40–60)
HGB BLD-MCNC: 13.9 G/DL (ref 13–17.7)
LDLC SERPL CALC-MCNC: 73 MG/DL (ref 0–100)
LDLC/HDLC SERPL: 1.86 {RATIO}
MCH RBC QN AUTO: 29.7 PG (ref 26.6–33)
MCHC RBC AUTO-ENTMCNC: 33.9 G/DL (ref 31.5–35.7)
MCV RBC AUTO: 87.6 FL (ref 79–97)
PLATELET # BLD AUTO: 262 10*3/MM3 (ref 140–450)
PMV BLD AUTO: 9.8 FL (ref 6–12)
POTASSIUM SERPL-SCNC: 3.5 MMOL/L (ref 3.5–5.2)
QT INTERVAL: 385 MS
QT INTERVAL: 435 MS
QTC INTERVAL: 404 MS
QTC INTERVAL: 415 MS
RBC # BLD AUTO: 4.68 10*6/MM3 (ref 4.14–5.8)
SODIUM SERPL-SCNC: 137 MMOL/L (ref 136–145)
TRIGL SERPL-MCNC: 200 MG/DL (ref 0–150)
VLDLC SERPL-MCNC: 34 MG/DL (ref 5–40)
WBC NRBC COR # BLD: 15.44 10*3/MM3 (ref 3.4–10.8)

## 2023-10-24 PROCEDURE — 25010000002 MIDAZOLAM PER 1MG: Performed by: INTERNAL MEDICINE

## 2023-10-24 PROCEDURE — C9600 PERC DRUG-EL COR STENT SING: HCPCS | Performed by: INTERNAL MEDICINE

## 2023-10-24 PROCEDURE — 25810000003 SODIUM CHLORIDE 0.9 % SOLUTION: Performed by: INTERNAL MEDICINE

## 2023-10-24 PROCEDURE — 93458 L HRT ARTERY/VENTRICLE ANGIO: CPT | Performed by: INTERNAL MEDICINE

## 2023-10-24 PROCEDURE — C1894 INTRO/SHEATH, NON-LASER: HCPCS | Performed by: INTERNAL MEDICINE

## 2023-10-24 PROCEDURE — 80048 BASIC METABOLIC PNL TOTAL CA: CPT | Performed by: INTERNAL MEDICINE

## 2023-10-24 PROCEDURE — C1725 CATH, TRANSLUMIN NON-LASER: HCPCS | Performed by: INTERNAL MEDICINE

## 2023-10-24 PROCEDURE — G0378 HOSPITAL OBSERVATION PER HR: HCPCS

## 2023-10-24 PROCEDURE — 96361 HYDRATE IV INFUSION ADD-ON: CPT

## 2023-10-24 PROCEDURE — 25510000001 IOPAMIDOL PER 1 ML: Performed by: INTERNAL MEDICINE

## 2023-10-24 PROCEDURE — 85027 COMPLETE CBC AUTOMATED: CPT | Performed by: INTERNAL MEDICINE

## 2023-10-24 PROCEDURE — 25010000002 FENTANYL CITRATE (PF) 50 MCG/ML SOLUTION: Performed by: INTERNAL MEDICINE

## 2023-10-24 PROCEDURE — 94761 N-INVAS EAR/PLS OXIMETRY MLT: CPT

## 2023-10-24 PROCEDURE — 25010000002 MORPHINE PER 10 MG: Performed by: INTERNAL MEDICINE

## 2023-10-24 PROCEDURE — 25010000002 HEPARIN (PORCINE) PER 1000 UNITS: Performed by: INTERNAL MEDICINE

## 2023-10-24 PROCEDURE — C1887 CATHETER, GUIDING: HCPCS | Performed by: INTERNAL MEDICINE

## 2023-10-24 PROCEDURE — C1769 GUIDE WIRE: HCPCS | Performed by: INTERNAL MEDICINE

## 2023-10-24 PROCEDURE — 99153 MOD SED SAME PHYS/QHP EA: CPT | Performed by: INTERNAL MEDICINE

## 2023-10-24 PROCEDURE — 83036 HEMOGLOBIN GLYCOSYLATED A1C: CPT | Performed by: INTERNAL MEDICINE

## 2023-10-24 PROCEDURE — C1874 STENT, COATED/COV W/DEL SYS: HCPCS | Performed by: INTERNAL MEDICINE

## 2023-10-24 PROCEDURE — 80061 LIPID PANEL: CPT | Performed by: INTERNAL MEDICINE

## 2023-10-24 PROCEDURE — 99152 MOD SED SAME PHYS/QHP 5/>YRS: CPT | Performed by: INTERNAL MEDICINE

## 2023-10-24 PROCEDURE — 25010000002 NITROGLYCERIN 100-5 MCG/ML-% SOLUTION: Performed by: INTERNAL MEDICINE

## 2023-10-24 PROCEDURE — 85347 COAGULATION TIME ACTIVATED: CPT

## 2023-10-24 PROCEDURE — 92928 PRQ TCAT PLMT NTRAC ST 1 LES: CPT | Performed by: INTERNAL MEDICINE

## 2023-10-24 PROCEDURE — 99222 1ST HOSP IP/OBS MODERATE 55: CPT | Performed by: INTERNAL MEDICINE

## 2023-10-24 PROCEDURE — 96376 TX/PRO/DX INJ SAME DRUG ADON: CPT

## 2023-10-24 DEVICE — XIENCE SKYPOINT™ EVEROLIMUS ELUTING CORONARY STENT SYSTEM 2.50 MM X 23 MM / RAPID-EXCHANGE
Type: IMPLANTABLE DEVICE | Status: FUNCTIONAL
Brand: XIENCE SKYPOINT™

## 2023-10-24 RX ORDER — MIDAZOLAM HYDROCHLORIDE 2 MG/2ML
INJECTION, SOLUTION INTRAMUSCULAR; INTRAVENOUS
Status: DISCONTINUED | OUTPATIENT
Start: 2023-10-24 | End: 2023-10-24 | Stop reason: HOSPADM

## 2023-10-24 RX ORDER — VERAPAMIL HYDROCHLORIDE 2.5 MG/ML
INJECTION, SOLUTION INTRAVENOUS
Status: DISCONTINUED | OUTPATIENT
Start: 2023-10-24 | End: 2023-10-24 | Stop reason: HOSPADM

## 2023-10-24 RX ORDER — AMLODIPINE BESYLATE 5 MG/1
5 TABLET ORAL DAILY
Status: DISCONTINUED | OUTPATIENT
Start: 2023-10-24 | End: 2023-10-25 | Stop reason: HOSPADM

## 2023-10-24 RX ORDER — SODIUM CHLORIDE 0.9 % (FLUSH) 0.9 %
10 SYRINGE (ML) INJECTION AS NEEDED
Status: DISCONTINUED | OUTPATIENT
Start: 2023-10-24 | End: 2023-10-25 | Stop reason: HOSPADM

## 2023-10-24 RX ORDER — BUPROPION HYDROCHLORIDE 150 MG/1
150 TABLET, EXTENDED RELEASE ORAL DAILY
Status: DISCONTINUED | OUTPATIENT
Start: 2023-10-24 | End: 2023-10-25 | Stop reason: HOSPADM

## 2023-10-24 RX ORDER — ASPIRIN 81 MG/1
81 TABLET ORAL DAILY
Status: DISCONTINUED | OUTPATIENT
Start: 2023-10-24 | End: 2023-10-25 | Stop reason: HOSPADM

## 2023-10-24 RX ORDER — RANOLAZINE 500 MG/1
500 TABLET, EXTENDED RELEASE ORAL EVERY 12 HOURS SCHEDULED
Status: DISCONTINUED | OUTPATIENT
Start: 2023-10-24 | End: 2023-10-25 | Stop reason: HOSPADM

## 2023-10-24 RX ORDER — SODIUM CHLORIDE 0.9 % (FLUSH) 0.9 %
10 SYRINGE (ML) INJECTION EVERY 12 HOURS SCHEDULED
Status: DISCONTINUED | OUTPATIENT
Start: 2023-10-24 | End: 2023-10-25 | Stop reason: HOSPADM

## 2023-10-24 RX ORDER — NITROGLYCERIN 0.4 MG/1
0.4 TABLET SUBLINGUAL
Status: DISCONTINUED | OUTPATIENT
Start: 2023-10-24 | End: 2023-10-25 | Stop reason: HOSPADM

## 2023-10-24 RX ORDER — ACETAMINOPHEN 325 MG/1
650 TABLET ORAL EVERY 4 HOURS PRN
Status: DISCONTINUED | OUTPATIENT
Start: 2023-10-24 | End: 2023-10-25 | Stop reason: HOSPADM

## 2023-10-24 RX ORDER — HEPARIN SODIUM 1000 [USP'U]/ML
INJECTION, SOLUTION INTRAVENOUS; SUBCUTANEOUS
Status: DISCONTINUED | OUTPATIENT
Start: 2023-10-24 | End: 2023-10-24 | Stop reason: HOSPADM

## 2023-10-24 RX ORDER — FENTANYL CITRATE 50 UG/ML
INJECTION, SOLUTION INTRAMUSCULAR; INTRAVENOUS
Status: DISCONTINUED | OUTPATIENT
Start: 2023-10-24 | End: 2023-10-24 | Stop reason: HOSPADM

## 2023-10-24 RX ORDER — ISOSORBIDE MONONITRATE 30 MG/1
30 TABLET, EXTENDED RELEASE ORAL
Status: DISCONTINUED | OUTPATIENT
Start: 2023-10-24 | End: 2023-10-25 | Stop reason: HOSPADM

## 2023-10-24 RX ORDER — DIAPER,BRIEF,INFANT-TODD,DISP
1 EACH MISCELLANEOUS ONCE
Status: COMPLETED | OUTPATIENT
Start: 2023-10-24 | End: 2023-10-24

## 2023-10-24 RX ORDER — SODIUM CHLORIDE 9 MG/ML
40 INJECTION, SOLUTION INTRAVENOUS AS NEEDED
Status: DISCONTINUED | OUTPATIENT
Start: 2023-10-24 | End: 2023-10-25 | Stop reason: HOSPADM

## 2023-10-24 RX ORDER — LOSARTAN POTASSIUM 50 MG/1
100 TABLET ORAL DAILY
Status: DISCONTINUED | OUTPATIENT
Start: 2023-10-24 | End: 2023-10-25 | Stop reason: HOSPADM

## 2023-10-24 RX ORDER — SODIUM CHLORIDE 9 MG/ML
100 INJECTION, SOLUTION INTRAVENOUS CONTINUOUS
Status: DISCONTINUED | OUTPATIENT
Start: 2023-10-25 | End: 2023-10-25 | Stop reason: HOSPADM

## 2023-10-24 RX ORDER — LIDOCAINE HYDROCHLORIDE 20 MG/ML
INJECTION, SOLUTION INFILTRATION; PERINEURAL
Status: DISCONTINUED | OUTPATIENT
Start: 2023-10-24 | End: 2023-10-24 | Stop reason: HOSPADM

## 2023-10-24 RX ORDER — ATORVASTATIN CALCIUM 40 MG/1
40 TABLET, FILM COATED ORAL NIGHTLY
Status: DISCONTINUED | OUTPATIENT
Start: 2023-10-24 | End: 2023-10-25 | Stop reason: HOSPADM

## 2023-10-24 RX ADMIN — ATORVASTATIN CALCIUM 40 MG: 40 TABLET, FILM COATED ORAL at 20:57

## 2023-10-24 RX ADMIN — NITROGLYCERIN 1 INCH: 20 OINTMENT TOPICAL at 12:02

## 2023-10-24 RX ADMIN — RANOLAZINE 500 MG: 500 TABLET, FILM COATED, EXTENDED RELEASE ORAL at 20:57

## 2023-10-24 RX ADMIN — MORPHINE SULFATE 2 MG: 2 INJECTION, SOLUTION INTRAMUSCULAR; INTRAVENOUS at 01:35

## 2023-10-24 RX ADMIN — LOSARTAN POTASSIUM 100 MG: 50 TABLET, FILM COATED ORAL at 08:26

## 2023-10-24 RX ADMIN — SODIUM CHLORIDE 100 ML/HR: 9 INJECTION, SOLUTION INTRAVENOUS at 10:44

## 2023-10-24 RX ADMIN — Medication 5 MG: at 20:57

## 2023-10-24 RX ADMIN — ASPIRIN 81 MG: 81 TABLET, COATED ORAL at 08:26

## 2023-10-24 RX ADMIN — BACITRACIN 0.9 G: 500 OINTMENT TOPICAL at 17:41

## 2023-10-24 RX ADMIN — SODIUM CHLORIDE 100 ML/HR: 9 INJECTION, SOLUTION INTRAVENOUS at 23:52

## 2023-10-24 RX ADMIN — BUPROPION HYDROCHLORIDE 150 MG: 150 TABLET, EXTENDED RELEASE ORAL at 08:26

## 2023-10-24 RX ADMIN — SODIUM CHLORIDE 100 ML/HR: 9 INJECTION, SOLUTION INTRAVENOUS at 23:32

## 2023-10-24 RX ADMIN — SODIUM CHLORIDE 100 ML/HR: 9 INJECTION, SOLUTION INTRAVENOUS at 23:58

## 2023-10-24 RX ADMIN — TICAGRELOR 90 MG: 90 TABLET ORAL at 20:58

## 2023-10-24 RX ADMIN — SODIUM CHLORIDE 100 ML/HR: 9 INJECTION, SOLUTION INTRAVENOUS at 17:27

## 2023-10-24 RX ADMIN — ISOSORBIDE MONONITRATE 30 MG: 30 TABLET, EXTENDED RELEASE ORAL at 08:26

## 2023-10-24 RX ADMIN — RANOLAZINE 500 MG: 500 TABLET, FILM COATED, EXTENDED RELEASE ORAL at 08:26

## 2023-10-24 RX ADMIN — AMLODIPINE BESYLATE 5 MG: 5 TABLET ORAL at 08:26

## 2023-10-24 RX ADMIN — Medication 10 ML: at 08:26

## 2023-10-24 RX ADMIN — NITROGLYCERIN 1 INCH: 20 OINTMENT TOPICAL at 05:28

## 2023-10-24 RX ADMIN — NITROGLYCERIN 1 INCH: 20 OINTMENT TOPICAL at 17:27

## 2023-10-24 RX ADMIN — DOCUSATE SODIUM 50MG AND SENNOSIDES 8.6MG 2 TABLET: 8.6; 5 TABLET, FILM COATED ORAL at 08:26

## 2023-10-24 NOTE — CASE MANAGEMENT/SOCIAL WORK
Discharge Planning Assessment   Claire     Patient Name: Deo Pacheco  MRN: [id number]  Today's Date: 10/24/2023    Admit Date: 10/23/2023    Plan: patient is independent at home working full time lives with his sig other. Patient plans to return home, pcp and pharm confirmed. Patient will be having a cardiac cath done, cm will follow for medications at discharge   Discharge Needs Assessment       Row Name 10/24/23 1309       Living Environment    People in Home significant other    Current Living Arrangements home    Potentially Unsafe Housing Conditions none    Primary Care Provided by self    Family Caregiver if Needed significant other    Quality of Family Relationships helpful;involved;supportive       Resource/Environmental Concerns    Resource/Environmental Concerns none    Transportation Concerns none       Transition Planning    Patient/Family Anticipates Transition to home with family    Patient/Family Anticipated Services at Transition none    Transportation Anticipated family or friend will provide       Discharge Needs Assessment    Readmission Within the Last 30 Days no previous admission in last 30 days    Equipment Currently Used at Home none    Concerns to be Addressed discharge planning    Anticipated Changes Related to Illness none    Equipment Needed After Discharge none                   Discharge Plan       Row Name 10/24/23 1311       Plan    Plan patient is independent at home working full time lives with his sig other. Patient plans to return home, pcp and pharm confirmed. Patient will be having a cardiac cath done, cm will follow for medications at discharge    Final Discharge Disposition Code 01 - home or self-care                  Continued Care and Services - Admitted Since 10/23/2023    Coordination has not been started for this encounter.          Demographic Summary       Row Name 10/24/23 1308       General Information    Admission Type observation    Arrived From  home;emergency department    Referral Source admission list    Preferred Language English       Contact Information    Permission Granted to Share Info With family/designee    Contact Information Obtained for                    Functional Status       Row Name 10/24/23 1352       Functional Status    Usual Activity Tolerance good    Current Activity Tolerance good       Assessment of Health Literacy    How often do you have someone help you read hospital materials? Occasionally    How often do you have problems learning about your medical condition because of difficulty understanding written information? Never    How often do you have a problem understanding what is told to you about your medical condition? Never    How confident are you filling out medical forms by yourself? Quite a bit    Health Literacy Good       Functional Status, IADL    Medications independent    Meal Preparation independent    Housekeeping independent    Laundry independent    Shopping independent       Mental Status    General Appearance WDL WDL       Mental Status Summary    Recent Changes in Mental Status/Cognitive Functioning no changes       Employment/    Employment Status employed full-time    Shift Worked first shift    Current or Previous Occupation service industry                   Psychosocial    No documentation.                  Abuse/Neglect    No documentation.                  Legal    No documentation.                  Substance Abuse    No documentation.                  Patient Forms    No documentation.                     Dora Morse RN

## 2023-10-24 NOTE — CONSULTS
Cardiology Consult Note  [facility] CARE UNIT          Patient Identification:  Deo Pacheco      [id number]  37 y.o.        male  1985       Date of Consultation: 10/24    Reason for Consultation: Unstable angina    PCP: Blanco Angelo MD  Primary cardiologist: Kobe    History of Present Illness:     37-year-old white male.  He has known coronary artery disease.  He has had previous PCI, I believe that was of a diagonal branch.  He had FFR negative RCA lesion assessed in 2019 and branch vessel disease in the circumflex.  He had been clinically stable up through his last office visit in December 2022.  He presents to the hospital with severe chest pain described as pressure, going on for 12 hours, radiating to the left arm associated with shortness of breath and worse with exertion.  His EKG in the emergency room showed nonspecific ST changes in the lateral leads.  He is comfortable currently and was given sublingual and transdermal nitro in the emergency room.  He has been compliant with his medical therapy.    Past History:  Past Medical History:   Diagnosis Date    Coronary artery disease     FOLLOWS W/KOBE, NO CP OR SOA    Epilepsy with status epilepticus 2015    NO ISSUES SINCE    Hypertension     Knee pain     LEFT    Knee sprain 2\28\23    Myocardial infarction 2019    STENT PLACED     Past Surgical History:   Procedure Laterality Date    CARDIAC CATHETERIZATION      CORONARY ANGIOPLASTY WITH STENT PLACEMENT Right 2019    KIDNEY STONE SURGERY      KNEE ARTHROSCOPY W/ MENISCECTOMY Left 6/15/2023    Procedure: KNEE ARTHROSCOPY WITH PARTIAL LATERAL MENISCECTOMY;  Surgeon: Suraj Nair MD;  Location: [facility] OR [facility];  Service: Orthopedics;  Laterality: Left;     No Known Allergies  Social History     Socioeconomic History    Marital status: Significant Other   Tobacco Use    Smoking status: Every Day     Packs/day: 0.50     Years: 10.00     Additional pack years: 0.00      Total pack years: 5.00     Types: Cigarettes     Start date: 1/1/2007    Smokeless tobacco: Never   Vaping Use    Vaping Use: Never used   Substance and Sexual Activity    Alcohol use: Never    Drug use: Never    Sexual activity: Defer     Family History   Problem Relation Age of Onset    Cancer Mother     Diabetes Father     Cancer Father      Medications:  Medications Prior to Admission   Medication Sig Dispense Refill Last Dose    amLODIPine (NORVASC) 5 MG tablet Take 1 tablet by mouth Daily.   10/23/2023    aspirin (aspirin) 81 MG EC tablet Take 1 tablet by mouth Daily. 90 tablet 33 10/23/2023    atorvastatin (LIPITOR) 40 MG tablet Take 1 tablet by mouth Daily.   10/23/2023    buPROPion SR (WELLBUTRIN SR) 150 MG 12 hr tablet Take 1 tablet by mouth Daily.   10/23/2023    isosorbide mononitrate (IMDUR) 30 MG 24 hr tablet Take 1 tablet by mouth Daily. 90 tablet 1 10/23/2023    losartan (COZAAR) 100 MG tablet Take 1 tablet by mouth Daily.   10/23/2023    ranolazine (RANEXA) 500 MG 12 hr tablet Take 1 tablet by mouth Every 12 (Twelve) Hours. (Patient taking differently: Take 1 tablet by mouth Daily.) 180 tablet 1 10/23/2023    nitroglycerin (Nitrostat) 0.4 MG SL tablet Place 1 tablet under the tongue Every 5 (Five) Minutes As Needed for Chest Pain. Take no more than 3 doses in 15 minutes. 25 tablet 12 More than a month     Current medications:  enoxaparin, 40 mg, Subcutaneous, Q24H  nitroglycerin, 1 inch, Topical, Q6H  senna-docusate sodium, 2 tablet, Oral, BID  sodium chloride, 10 mL, Intravenous, Q12H      Current IV drips:  sodium chloride, 100 mL/hr, Last Rate: 100 mL/hr (10/23/23 2330)        Review of Systems   Constitutional: Positive for malaise/fatigue.   HENT: Negative.     Eyes: Negative.    Cardiovascular:  Positive for chest pain.   Respiratory:  Positive for shortness of breath.    Endocrine: Negative.    Hematologic/Lymphatic: Negative.    Skin: Negative.    Musculoskeletal: Negative.   "  Gastrointestinal: Negative.    Genitourinary: Negative.    Neurological: Negative.    Psychiatric/Behavioral: Negative.           Physical exam:    /84 (BP Location: Right arm, Patient Position: Lying)   Pulse 72   Temp 98.2 °F (36.8 °C) (Oral)   Resp 18   Ht 170.2 cm (67.01\")   Wt 118 kg (260 lb 5.8 oz)   SpO2 95%   BMI 40.77 kg/m²  Body mass index is 40.77 kg/m².    SpO2  Min: 92 %  Max: 99 %    General Appearance:   well developed  well nourished  HENT:   oropharynx moist  lips not cyanotic  Neck:  thyroid not enlarged  supple  Respiratory:  no respiratory distress  normal breath sounds  no rales  Cardiovascular:  no jugular venous distention  regular rhythm  apical impulse normal  S1 normal, S2 normal  no S3, no S4   no murmur  no rub, no thrill  carotid pulses normal; no bruit  pedal pulses normal  lower extremity edema: none    Gastrointestinal:   bowel sounds normal  non-tender  no hepatomegaly, no splenomegaly  Musculoskeletal:  no clubbing of fingers.   normocephalic, head atraumatic  Skin:   warm, dry  Neuro/Psychiatric:  judgement and insight appropriate  normal mood and affect    Cardiographics:     ECG  (personally reviewed) sinus rhythm with nonspecific lateral ST changes   Telemetry:  (personally reviewed)    ECHO:    CATH:     CARDIOLITE:      Lab Review:       Results from last 7 days   Lab Units 10/24/23  0445 10/23/23  1005   WBC 10*3/mm3 15.44* 13.56*   HEMOGLOBIN g/dL 13.9 14.8   HEMATOCRIT % 41.0 43.3            Results from last 7 days   Lab Units 10/24/23  0445 10/23/23  1005   SODIUM mmol/L 137 136   BUN mg/dL 15 14   CREATININE mg/dL 0.77 0.80   GLUCOSE mg/dL 204* 165*      Estimated Creatinine Clearance: 164.6 mL/min (by C-G formula based on SCr of 0.77 mg/dL).     Results from last 7 days   Lab Units 10/24/23  0445   CHOLESTEROL mg/dL 143   HDL CHOL mg/dL 36*           No results found for: \"TSH\"   No results found for: \"HGBA1C\"        No results found for: \"DIGOXIN\"   No " "components found for: \"DDIMERQUAN\"     Imaging:   XR Chest 1 View    Result Date: 10/23/2023  PROCEDURE: XR CHEST 1 VW  COMPARISON: [facility], CR, XR CHEST 1 VW, 4/19/2022, 12:35.  INDICATIONS: Chest Pain Triage Protocol  FINDINGS:  The heart appears mildly enlarged.  Mediastinal contour appears within normal limits.  No significant effusion, pneumothorax, or pulmonary infiltrate.      Impression:   1. Mild cardiomegaly. 2. No radiographic findings of acute pulmonary abnormality.       ANTONIETA DIAS MD       Electronically Signed and Approved By: ANTONIETA DIAS MD on 10/23/2023 at 10:20                The ASCVD Risk score (Lexi DK, et al., 2019) failed to calculate for the following reasons:    The 2019 ASCVD risk score is only valid for ages 40 to 79      Assessment:      Unstable angina      Initial cardiac assessment: 37-year-old white male with known coronary artery disease presents with symptoms consistent with unstable angina factors, his troponin is slightly elevated, EKG showed nonspecific lateral ST changes.  He is comfortable currently.      Recommendations:  1.  Based on the presentation I recommend cardiac catheterization for reassessment for obstructive coronary artery disease.  The risk and benefits were discussed with the patient and he is agreeable to proceed.  2.  Continue aspirin, statin, antianginal therapy per home regimen  3.  Further recommendations will depend on angiographic findings                Quique Bullock MD  10/24/2023    07:24 EDT    "

## 2023-10-24 NOTE — PLAN OF CARE
Goal Outcome Evaluation:  Plan of Care Reviewed With: patient        Progress: improving. Reports chest discomfort after being up to the bathroom. PRN morphine has been effective in relieving the pain.

## 2023-10-24 NOTE — PROGRESS NOTES
[facility]   Hospitalist Progress Note  Date: 10/24/2023  Patient Name: Deo Pacheco  : 1985  MRN: [id number]  Date of admission: 10/23/2023  Room/Bed: 201/      Subjective   Subjective     Chief Complaint: Chest pain    Summary:Deo Pacheco is a 37 y.o. male past medical history frequent for coronary artery disease that is post stent placement, and hypertension. Presented to the emergency department with complaints of chest pain which she describes as a pressure and occasionally stabbing type of pain in his left chest area radiating to his left neck.  He reports shortness of breath and diaphoresis but no nausea or vomiting. He received sublingual nitro with minimal improvement in his symptoms and then subsequently received morphine and nitro patch and at the time of my evaluation he reports pain is resolved. Initial presentation emergency department temperature is noted to be 98.1 pulse 70 respirations 18 blood pressure 148/103 pulse ox 99% on unknown flow of oxygen.  Labs showed initial troponin of 9 with a repeat of 18.  CBC with a WBC of 13.5, CMP with glucose of 165    Interval Followup: No acute interval events.  No acute distress.  Patient is resting comfortably in bed with family at bedside.  He reports that he has continued chest pain this morning but it is a 1-2/10, much improved from the 10/10 pain he was having at time of admission.  He had some diaphoresis overnight did not sleep well.  Denies any shortness of breath.  Planning for left heart cath this afternoon with cardiology.    Review of Systems    All systems reviewed and negative except for what is outlined above.      Objective   Objective     Vitals:   Temp:  [97.5 °F (36.4 °C)-98.2 °F (36.8 °C)] 98.2 °F (36.8 °C)  Heart Rate:  [56-90] 73  Resp:  [16-20] 18  BP: ()/() 145/101    Physical Exam   Gen: NAD, Alert and Oriented  Cards: RRR, no murmur   Pulm: CTA b/l, no wheezing  Abd: soft,  nondistended  Extremities: no pitting edema    Result Review    Result Review:  I have personally reviewed these results:  [x]  Laboratory      Lab 10/24/23  0445 10/23/23  1005   WBC 15.44* 13.56*   HEMOGLOBIN 13.9 14.8   HEMATOCRIT 41.0 43.3   PLATELETS 262 283   NEUTROS ABS  --  9.82*   IMMATURE GRANS (ABS)  --  0.11*   LYMPHS ABS  --  2.57   MONOS ABS  --  0.80   EOS ABS  --  0.20   MCV 87.6 86.4         Lab 10/24/23  0445 10/23/23  1005   SODIUM 137 136   POTASSIUM 3.5 3.5   CHLORIDE 104 103   CO2 21.5* 22.3   ANION GAP 11.5 10.7   BUN 15 14   CREATININE 0.77 0.80   EGFR 119.7 118.4   GLUCOSE 204* 165*   CALCIUM 8.1* 8.6   MAGNESIUM  --  2.0   HEMOGLOBIN A1C 7.00*  --          Lab 10/23/23  1005   TOTAL PROTEIN 6.6   ALBUMIN 4.1   GLOBULIN 2.5   ALT (SGPT) 22   AST (SGOT) 10   BILIRUBIN 0.5   ALK PHOS 101   LIPASE 27         Lab 10/23/23  1510 10/23/23  1203 10/23/23  1005   PROBNP  --   --  <36.0   HSTROP T 26* 18* 9         Lab 10/24/23  0445   CHOLESTEROL 143   LDL CHOL 73   HDL CHOL 36*   TRIGLYCERIDES 200*             Brief Urine Lab Results       None          [x]  Microbiology   Microbiology Results (last 10 days)       ** No results found for the last 240 hours. **          [x]  Radiology  XR Chest 1 View    Result Date: 10/23/2023    1. Mild cardiomegaly. 2. No radiographic findings of acute pulmonary abnormality.       ANTONIETA DIAS MD       Electronically Signed and Approved By: ANTONIETA DIAS MD on 10/23/2023 at 10:20            [x]  EKG/Telemetry   []  Cardiology/Vascular   []  Pathology  []  Old records  []  Other:    Assessment & Plan   Assessment / Plan     Assessment:  Substernal chest pain  CAD s/p stent x2  Hypertension  Hyperlipidemia  Tobacco abuse  Type 2 diabetes    Plan:  Cardiology on board, given his typical chest pain and history of CAD he is going for left heart cath this afternoon  N.p.o. for procedure  Continue aspirin and high intensity statin  Lipid panel reviewed, currently  controlled  A1c 7, will clarify with him if this is a new diagnosis of diabetes.  He will most likely need to be started on metformin at discharge and follow-up with PCP.  Diabetic educator.  Nicotine patch  Blood pressure mildly elevated, restarted on home medications  Echo April 2022, left ventricular thickness consistent with borderline concentric hypertrophy, EF 61 to 65%.       Discussed with RN.    DVT prophylaxis:  Medical DVT prophylaxis orders are present.    CODE STATUS:   Level Of Support Discussed With: Patient  Code Status (Patient has no pulse and is not breathing): CPR (Attempt to Resuscitate)  Medical Interventions (Patient has pulse or is breathing): Full Support      Electronically signed by Echo Tidwell DO, 10/24/23, 10:57 AM EDT.

## 2023-10-24 NOTE — H&P (VIEW-ONLY)
Cardiology Consult Note  [facility] CARE UNIT          Patient Identification:  Deo Pacheco      [id number]  37 y.o.        male  1985       Date of Consultation: 10/24    Reason for Consultation: Unstable angina    PCP: Blanco Angelo MD  Primary cardiologist: Kobe    History of Present Illness:     37-year-old white male.  He has known coronary artery disease.  He has had previous PCI, I believe that was of a diagonal branch.  He had FFR negative RCA lesion assessed in 2019 and branch vessel disease in the circumflex.  He had been clinically stable up through his last office visit in December 2022.  He presents to the hospital with severe chest pain described as pressure, going on for 12 hours, radiating to the left arm associated with shortness of breath and worse with exertion.  His EKG in the emergency room showed nonspecific ST changes in the lateral leads.  He is comfortable currently and was given sublingual and transdermal nitro in the emergency room.  He has been compliant with his medical therapy.    Past History:  Past Medical History:   Diagnosis Date    Coronary artery disease     FOLLOWS W/KOBE, NO CP OR SOA    Epilepsy with status epilepticus 2015    NO ISSUES SINCE    Hypertension     Knee pain     LEFT    Knee sprain 2\28\23    Myocardial infarction 2019    STENT PLACED     Past Surgical History:   Procedure Laterality Date    CARDIAC CATHETERIZATION      CORONARY ANGIOPLASTY WITH STENT PLACEMENT Right 2019    KIDNEY STONE SURGERY      KNEE ARTHROSCOPY W/ MENISCECTOMY Left 6/15/2023    Procedure: KNEE ARTHROSCOPY WITH PARTIAL LATERAL MENISCECTOMY;  Surgeon: Suraj Nair MD;  Location: [facility] [place];  Service: Orthopedics;  Laterality: Left;     No Known Allergies  Social History     Socioeconomic History    Marital status: Significant Other   Tobacco Use    Smoking status: Every Day     Packs/day: 0.50     Years: 10.00     Additional pack years: 0.00      Total pack years: 5.00     Types: Cigarettes     Start date: 1/1/2007    Smokeless tobacco: Never   Vaping Use    Vaping Use: Never used   Substance and Sexual Activity    Alcohol use: Never    Drug use: Never    Sexual activity: Defer     Family History   Problem Relation Age of Onset    Cancer Mother     Diabetes Father     Cancer Father      Medications:  Medications Prior to Admission   Medication Sig Dispense Refill Last Dose    amLODIPine (NORVASC) 5 MG tablet Take 1 tablet by mouth Daily.   10/23/2023    aspirin (aspirin) 81 MG EC tablet Take 1 tablet by mouth Daily. 90 tablet 33 10/23/2023    atorvastatin (LIPITOR) 40 MG tablet Take 1 tablet by mouth Daily.   10/23/2023    buPROPion SR (WELLBUTRIN SR) 150 MG 12 hr tablet Take 1 tablet by mouth Daily.   10/23/2023    isosorbide mononitrate (IMDUR) 30 MG 24 hr tablet Take 1 tablet by mouth Daily. 90 tablet 1 10/23/2023    losartan (COZAAR) 100 MG tablet Take 1 tablet by mouth Daily.   10/23/2023    ranolazine (RANEXA) 500 MG 12 hr tablet Take 1 tablet by mouth Every 12 (Twelve) Hours. (Patient taking differently: Take 1 tablet by mouth Daily.) 180 tablet 1 10/23/2023    nitroglycerin (Nitrostat) 0.4 MG SL tablet Place 1 tablet under the tongue Every 5 (Five) Minutes As Needed for Chest Pain. Take no more than 3 doses in 15 minutes. 25 tablet 12 More than a month     Current medications:  enoxaparin, 40 mg, Subcutaneous, Q24H  nitroglycerin, 1 inch, Topical, Q6H  senna-docusate sodium, 2 tablet, Oral, BID  sodium chloride, 10 mL, Intravenous, Q12H      Current IV drips:  sodium chloride, 100 mL/hr, Last Rate: 100 mL/hr (10/23/23 2330)        Review of Systems   Constitutional: Positive for malaise/fatigue.   HENT: Negative.     Eyes: Negative.    Cardiovascular:  Positive for chest pain.   Respiratory:  Positive for shortness of breath.    Endocrine: Negative.    Hematologic/Lymphatic: Negative.    Skin: Negative.    Musculoskeletal: Negative.   "  Gastrointestinal: Negative.    Genitourinary: Negative.    Neurological: Negative.    Psychiatric/Behavioral: Negative.           Physical exam:    /84 (BP Location: Right arm, Patient Position: Lying)   Pulse 72   Temp 98.2 °F (36.8 °C) (Oral)   Resp 18   Ht 170.2 cm (67.01\")   Wt 118 kg (260 lb 5.8 oz)   SpO2 95%   BMI 40.77 kg/m²  Body mass index is 40.77 kg/m².    SpO2  Min: 92 %  Max: 99 %    General Appearance:   well developed  well nourished  HENT:   oropharynx moist  lips not cyanotic  Neck:  thyroid not enlarged  supple  Respiratory:  no respiratory distress  normal breath sounds  no rales  Cardiovascular:  no jugular venous distention  regular rhythm  apical impulse normal  S1 normal, S2 normal  no S3, no S4   no murmur  no rub, no thrill  carotid pulses normal; no bruit  pedal pulses normal  lower extremity edema: none    Gastrointestinal:   bowel sounds normal  non-tender  no hepatomegaly, no splenomegaly  Musculoskeletal:  no clubbing of fingers.   normocephalic, head atraumatic  Skin:   warm, dry  Neuro/Psychiatric:  judgement and insight appropriate  normal mood and affect    Cardiographics:     ECG  (personally reviewed) sinus rhythm with nonspecific lateral ST changes   Telemetry:  (personally reviewed)    ECHO:    CATH:     CARDIOLITE:      Lab Review:       Results from last 7 days   Lab Units 10/24/23  0445 10/23/23  1005   WBC 10*3/mm3 15.44* 13.56*   HEMOGLOBIN g/dL 13.9 14.8   HEMATOCRIT % 41.0 43.3            Results from last 7 days   Lab Units 10/24/23  0445 10/23/23  1005   SODIUM mmol/L 137 136   BUN mg/dL 15 14   CREATININE mg/dL 0.77 0.80   GLUCOSE mg/dL 204* 165*      Estimated Creatinine Clearance: 164.6 mL/min (by C-G formula based on SCr of 0.77 mg/dL).     Results from last 7 days   Lab Units 10/24/23  0445   CHOLESTEROL mg/dL 143   HDL CHOL mg/dL 36*           No results found for: \"TSH\"   No results found for: \"HGBA1C\"        No results found for: \"DIGOXIN\"   No " "components found for: \"DDIMERQUAN\"     Imaging:   XR Chest 1 View    Result Date: 10/23/2023  PROCEDURE: XR CHEST 1 VW  COMPARISON: [facility], CR, XR CHEST 1 VW, 4/19/2022, 12:35.  INDICATIONS: Chest Pain Triage Protocol  FINDINGS:  The heart appears mildly enlarged.  Mediastinal contour appears within normal limits.  No significant effusion, pneumothorax, or pulmonary infiltrate.      Impression:   1. Mild cardiomegaly. 2. No radiographic findings of acute pulmonary abnormality.       ANTONIETA DIAS MD       Electronically Signed and Approved By: ANTONIETA DIAS MD on 10/23/2023 at 10:20                The ASCVD Risk score (Lexi DK, et al., 2019) failed to calculate for the following reasons:    The 2019 ASCVD risk score is only valid for ages 40 to 79      Assessment:      Unstable angina      Initial cardiac assessment: 37-year-old white male with known coronary artery disease presents with symptoms consistent with unstable angina factors, his troponin is slightly elevated, EKG showed nonspecific lateral ST changes.  He is comfortable currently.      Recommendations:  1.  Based on the presentation I recommend cardiac catheterization for reassessment for obstructive coronary artery disease.  The risk and benefits were discussed with the patient and he is agreeable to proceed.  2.  Continue aspirin, statin, antianginal therapy per home regimen  3.  Further recommendations will depend on angiographic findings                Quique Bullock MD  10/24/2023    07:24 EDT    "

## 2023-10-25 ENCOUNTER — READMISSION MANAGEMENT (OUTPATIENT)
Dept: CALL CENTER | Facility: HOSPITAL | Age: 38
End: 2023-10-25
Payer: COMMERCIAL

## 2023-10-25 VITALS
HEIGHT: 67 IN | OXYGEN SATURATION: 96 % | TEMPERATURE: 98.1 F | RESPIRATION RATE: 18 BRPM | SYSTOLIC BLOOD PRESSURE: 139 MMHG | HEART RATE: 82 BPM | DIASTOLIC BLOOD PRESSURE: 92 MMHG | WEIGHT: 261.02 LBS | BODY MASS INDEX: 40.97 KG/M2

## 2023-10-25 LAB
ANION GAP SERPL CALCULATED.3IONS-SCNC: 9.7 MMOL/L (ref 5–15)
BUN SERPL-MCNC: 8 MG/DL (ref 6–20)
BUN/CREAT SERPL: 9.2 (ref 7–25)
CALCIUM SPEC-SCNC: 8.1 MG/DL (ref 8.6–10.5)
CHLORIDE SERPL-SCNC: 104 MMOL/L (ref 98–107)
CHOLEST SERPL-MCNC: 132 MG/DL (ref 0–200)
CO2 SERPL-SCNC: 23.3 MMOL/L (ref 22–29)
CREAT SERPL-MCNC: 0.87 MG/DL (ref 0.76–1.27)
DEPRECATED RDW RBC AUTO: 42.3 FL (ref 37–54)
EGFRCR SERPLBLD CKD-EPI 2021: 115.4 ML/MIN/1.73
ERYTHROCYTE [DISTWIDTH] IN BLOOD BY AUTOMATED COUNT: 13.2 % (ref 12.3–15.4)
GLUCOSE SERPL-MCNC: 143 MG/DL (ref 65–99)
HCT VFR BLD AUTO: 39.1 % (ref 37.5–51)
HDLC SERPL-MCNC: 41 MG/DL (ref 40–60)
HGB BLD-MCNC: 13.2 G/DL (ref 13–17.7)
LDLC SERPL CALC-MCNC: 75 MG/DL (ref 0–100)
LDLC/HDLC SERPL: 1.82 {RATIO}
MCH RBC QN AUTO: 29.6 PG (ref 26.6–33)
MCHC RBC AUTO-ENTMCNC: 33.8 G/DL (ref 31.5–35.7)
MCV RBC AUTO: 87.7 FL (ref 79–97)
PLATELET # BLD AUTO: 233 10*3/MM3 (ref 140–450)
PMV BLD AUTO: 9.8 FL (ref 6–12)
POTASSIUM SERPL-SCNC: 3.6 MMOL/L (ref 3.5–5.2)
RBC # BLD AUTO: 4.46 10*6/MM3 (ref 4.14–5.8)
SODIUM SERPL-SCNC: 137 MMOL/L (ref 136–145)
TRIGL SERPL-MCNC: 81 MG/DL (ref 0–150)
VLDLC SERPL-MCNC: 16 MG/DL (ref 5–40)
WBC NRBC COR # BLD: 11.6 10*3/MM3 (ref 3.4–10.8)

## 2023-10-25 PROCEDURE — 94761 N-INVAS EAR/PLS OXIMETRY MLT: CPT

## 2023-10-25 PROCEDURE — 80048 BASIC METABOLIC PNL TOTAL CA: CPT | Performed by: INTERNAL MEDICINE

## 2023-10-25 PROCEDURE — G0378 HOSPITAL OBSERVATION PER HR: HCPCS

## 2023-10-25 PROCEDURE — 94799 UNLISTED PULMONARY SVC/PX: CPT

## 2023-10-25 PROCEDURE — 85027 COMPLETE CBC AUTOMATED: CPT | Performed by: INTERNAL MEDICINE

## 2023-10-25 PROCEDURE — 99232 SBSQ HOSP IP/OBS MODERATE 35: CPT | Performed by: INTERNAL MEDICINE

## 2023-10-25 PROCEDURE — 80061 LIPID PANEL: CPT | Performed by: INTERNAL MEDICINE

## 2023-10-25 RX ADMIN — TICAGRELOR 90 MG: 90 TABLET ORAL at 10:42

## 2023-10-25 RX ADMIN — AMLODIPINE BESYLATE 5 MG: 5 TABLET ORAL at 09:12

## 2023-10-25 RX ADMIN — BUPROPION HYDROCHLORIDE 150 MG: 150 TABLET, EXTENDED RELEASE ORAL at 09:13

## 2023-10-25 RX ADMIN — Medication 10 ML: at 09:14

## 2023-10-25 RX ADMIN — LOSARTAN POTASSIUM 100 MG: 50 TABLET, FILM COATED ORAL at 09:12

## 2023-10-25 RX ADMIN — ASPIRIN 81 MG: 81 TABLET, COATED ORAL at 09:12

## 2023-10-25 RX ADMIN — ISOSORBIDE MONONITRATE 30 MG: 30 TABLET, EXTENDED RELEASE ORAL at 09:12

## 2023-10-25 RX ADMIN — RANOLAZINE 500 MG: 500 TABLET, FILM COATED, EXTENDED RELEASE ORAL at 10:42

## 2023-10-25 NOTE — DISCHARGE INSTRUCTIONS
No lifting over 10 pounds for the next 72 hours with right hand  Off work for the rest of this week, okay to return to work this coming Monday

## 2023-10-25 NOTE — OUTREACH NOTE
Prep Survey      Flowsheet Row Responses   [facility] facility patient discharged from? [facility]   Is LACE score < 7 ? Yes   Eligibility Not Eligible   What are the reasons patient is not eligible? Other  [low risk for readmission]   Does the patient have one of the following disease processes/diagnoses(primary or secondary)? Other   Prep survey completed? Yes            Lorena REILLY - Registered Nurse

## 2023-10-25 NOTE — PLAN OF CARE
Goal Outcome Evaluation:  Plan of Care Reviewed With: patient        Progress: improving. No complaints of chest pain.

## 2023-10-25 NOTE — DISCHARGE SUMMARY
[facility]         HOSPITALIST  DISCHARGE SUMMARY    Patient Name: Deo Pacheco  : 1985  MRN: [id number]    Date of Admission: 10/23/2023  Date of Discharge:  10/25/2023  Primary Care Physician: Blanco Angelo MD    Consults       Date and Time Order Name Status Description    10/23/2023  1:42 PM Cardiology (on-call MD unless specified)      10/23/2023  1:06 PM Inpatient Hospitalist Consult              Active and Resolved Hospital Problems:  Active Hospital Problems    Diagnosis POA    Unstable angina [I20.0] Unknown      Resolved Hospital Problems    Diagnosis POA    **Chest pain [R07.9] Yes       Hospital Course     Hospital Course:  Deo Pacheco is a 37 y.o. male past medical history frequent for coronary artery disease that is post stent placement, and hypertension. Presented to the emergency department with complaints of chest pain which she describes as a pressure and occasionally stabbing type of pain in his left chest area radiating to his left neck.  He reports shortness of breath and diaphoresis but no nausea or vomiting. He received sublingual nitro with minimal improvement in his symptoms and then subsequently received morphine and nitro patch and at the time of my evaluation he reports pain is resolved. Initial presentation emergency department temperature is noted to be 98.1 pulse 70 respirations 18 blood pressure 148/103 pulse ox 99% on unknown flow of oxygen.  Labs showed initial troponin of 9 with a repeat of 18.  CBC with a WBC of 13.5, CMP with glucose of 165     Cardiology evaluated the patient and took him for left heart cath on 10/24.  He received PCI to the circumflex.  He had residual disease in the distal LAD and distal circumflex which is planning to have staged PCI 4.  He was initiated on aspirin and Brilinta.  Counseled patient on medication adherence to prevent stent thrombosis.  Patient was found to have an A1c of 7.  He previously states  that he had been prediabetic.  Counseled him on starting metformin which was ordered at discharge and potential side effect of diarrhea.  Recommend that he follow-up with PCP for further evaluation and management of his type 2 diabetes.  Patient remained chest pain-free following his procedure and was discharged home.    Patient discharged in stable condition.    DISCHARGE Follow Up Recommendations for labs and diagnostics: Follow-up with PCP in 1 week.  Follow-up with cardiology in 2 weeks.      Day of Discharge     Vital Signs:  Temp:  [97.7 °F (36.5 °C)-98.8 °F (37.1 °C)] 98.1 °F (36.7 °C)  Heart Rate:  [68-82] 82  Resp:  [18-20] 18  BP: (122-142)/(59-92) 139/92  Flow (L/min):  [2] 2    Physical Exam:   Gen: NAD, Alert and Oriented  Cards: RRR, no murmur   Pulm: CTA b/l, no wheezing  Abd: soft, nondistended  Extremities: no pitting edema      Discharge Details        Discharge Medications        New Medications        Instructions Start Date   metFORMIN 500 MG tablet  Commonly known as: GLUCOPHAGE   500 mg, Oral, 2 Times Daily With Meals      ticagrelor 90 MG tablet tablet  Commonly known as: BRILINTA   90 mg, Oral, 2 Times Daily             Changes to Medications        Instructions Start Date   ranolazine 500 MG 12 hr tablet  Commonly known as: RANEXA  What changed: when to take this   500 mg, Oral, Every 12 Hours Scheduled             Continue These Medications        Instructions Start Date   amLODIPine 5 MG tablet  Commonly known as: NORVASC   5 mg, Oral, Daily      aspirin 81 MG EC tablet   81 mg, Oral, Daily      atorvastatin 40 MG tablet  Commonly known as: LIPITOR   40 mg, Oral, Daily      buPROPion  MG 12 hr tablet  Commonly known as: WELLBUTRIN SR   Take 1 tablet by mouth Daily.      isosorbide mononitrate 30 MG 24 hr tablet  Commonly known as: IMDUR   30 mg, Oral, Every 24 Hours Scheduled      losartan 100 MG tablet  Commonly known as: COZAAR   100 mg, Oral, Daily      nitroglycerin 0.4 MG SL  tablet  Commonly known as: Nitrostat   0.4 mg, Sublingual, Every 5 Minutes PRN, Take no more than 3 doses in 15 minutes.               No Known Allergies    Discharge Disposition:  Home or Self Care    Diet:  Hospital:  Diet Order   Procedures    Diet: Regular/House Diet; Texture: Regular Texture (IDDSI 7); Fluid Consistency: Thin (IDDSI 0)       Discharge Activity:       CODE STATUS:  Code Status and Medical Interventions:   Ordered at: 10/23/23 1410     Level Of Support Discussed With:    Patient     Code Status (Patient has no pulse and is not breathing):    CPR (Attempt to Resuscitate)     Medical Interventions (Patient has pulse or is breathing):    Full Support         Future Appointments   Date Time Provider Department Center   11/9/2023 11:45 AM Sommer Berumen APRN [facility] CD EDIXE [facility]   11/17/2023  2:00 PM Katia Mcneil PA-C [facility] ORS RING [facility]   12/15/2023 10:15 AM TABITHA Bullock MD [facility] CD EDIXE [facility]       Additional Instructions for the Follow-ups that You Need to Schedule       Discharge Follow-up with PCP   As directed       Currently Documented PCP:    Blanco Angelo MD    PCP Phone Number:    [phone]     Follow Up Details: one week        Discharge Follow-up with Specialty: Cardiology; 2 Weeks   As directed      Specialty: Cardiology   Follow Up: 2 Weeks                Pertinent  and/or Most Recent Results         LAB RESULTS:      Lab 10/25/23  0424 10/24/23  0445 10/23/23  1005   WBC 11.60* 15.44* 13.56*   HEMOGLOBIN 13.2 13.9 14.8   HEMATOCRIT 39.1 41.0 43.3   PLATELETS 233 262 283   NEUTROS ABS  --   --  9.82*   IMMATURE GRANS (ABS)  --   --  0.11*   LYMPHS ABS  --   --  2.57   MONOS ABS  --   --  0.80   EOS ABS  --   --  0.20   MCV 87.7 87.6 86.4         Lab 10/25/23  0424 10/24/23  0445 10/23/23  1005   SODIUM 137 137 136   POTASSIUM 3.6 3.5 3.5   CHLORIDE 104 104 103   CO2 23.3 21.5* 22.3   ANION GAP 9.7 11.5 10.7   BUN 8 15 14   CREATININE 0.87 0.77 0.80   EGFR 115.4 119.7 118.4    GLUCOSE 143* 204* 165*   CALCIUM 8.1* 8.1* 8.6   MAGNESIUM  --   --  2.0   HEMOGLOBIN A1C  --  7.00*  --          Lab 10/23/23  1005   TOTAL PROTEIN 6.6   ALBUMIN 4.1   GLOBULIN 2.5   ALT (SGPT) 22   AST (SGOT) 10   BILIRUBIN 0.5   ALK PHOS 101   LIPASE 27         Lab 10/23/23  1510 10/23/23  1203 10/23/23  1005   PROBNP  --   --  <36.0   HSTROP T 26* 18* 9         Lab 10/25/23  0424 10/24/23  0445   CHOLESTEROL 132 143   LDL CHOL 75 73   HDL CHOL 41 36*   TRIGLYCERIDES 81 200*             Brief Urine Lab Results       None          Microbiology Results (last 10 days)       ** No results found for the last 240 hours. **            XR Chest 1 View    Result Date: 10/23/2023    1. Mild cardiomegaly. 2. No radiographic findings of acute pulmonary abnormality.       ANTONIETA DIAS MD       Electronically Signed and Approved By: ANTONIETA DIAS MD on 10/23/2023 at 10:20                           Time spent on Discharge including face to face service:  >30 minutes    Electronically signed by Echo Tidwell DO, 10/25/23, 1:00 PM EDT.

## 2023-10-25 NOTE — DISCHARGE INSTR - APPOINTMENTS
Patient needs to follow up with Sommer Berumen(Cardiology) on 11/9/23 @11:45am [phone]  Patient needs to follow up with (PCP) on 11/9/23 @3:20pm [phone]  
Strong peripheral pulses/Capillary refill less/equal to 2 seconds

## 2023-10-25 NOTE — PROGRESS NOTES
CARDIOLOGY  INPATIENT PROGRESS NOTE         [facility] UNIT    10/25/2023      PATIENT IDENTIFICATION:   Name:  Deo Pacheco      MRN:  [id number]     37 y.o.  male             Reason for visit: Acute coronary syndrome        SUBJECTIVE:    No events overnight, vital signs stable.  No chest pain.  Status post PCI to occluded circumflex yesterday.  OBJECTIVE:  Vitals:    10/25/23 0345 10/25/23 0613 10/25/23 0717 10/25/23 0723   BP: 138/88  139/92    BP Location: Left arm  Left arm    Patient Position: Lying  Lying    Pulse: 81  82    Resp: 20  18    Temp: 98.6 °F (37 °C)  98.1 °F (36.7 °C)    TempSrc: Oral  Oral    SpO2: 93%  97% 96%   Weight:  118 kg (261 lb 0.4 oz)     Height:               Body mass index is 40.87 kg/m².    Intake/Output Summary (Last 24 hours) at 10/25/2023 0819  Last data filed at 10/25/2023 0345  Gross per 24 hour   Intake 1911 ml   Output 800 ml   Net 1111 ml       Telemetry: Sinus rhythm    Review of Systems   Respiratory:  Negative for shortness of breath.    Cardiovascular:  Negative for chest pain.         Exam:  General appearance no acute distress    well nourished   HEENT sclerae non-icteric    lips not cyanotic   Respiratory rate and depth normal    normal breath sounds    no rales, no wheeze   Cardiovascular JVP normal    regular rhythm    S1 normal, S2 normal    no S3, no S4    no murmur    lower extremity edema:none   Abdominal bowel sounds normal    abdomen soft, non-tender    no hepatosplenomegaly   Neuro-psych oriented to person, place, time    cooperative     No Known Allergies  Scheduled meds:  amLODIPine, 5 mg, Oral, Daily  aspirin, 81 mg, Oral, Daily  atorvastatin, 40 mg, Oral, Nightly  buPROPion SR, 150 mg, Oral, Daily  enoxaparin, 40 mg, Subcutaneous, Q24H  isosorbide mononitrate, 30 mg, Oral, Q24H  losartan, 100 mg, Oral, Daily  nitroglycerin, 1 inch, Topical, Q6H  ranolazine, 500 mg, Oral, Q12H  senna-docusate sodium, 2 tablet, Oral,  "BID  sodium chloride, 10 mL, Intravenous, Q12H  sodium chloride, 10 mL, Intravenous, Q12H  ticagrelor, 90 mg, Oral, BID      IV meds:                      sodium chloride, 100 mL/hr, Last Rate: 100 mL/hr (10/24/23 2352)  sodium chloride, 100 mL/hr, Last Rate: 100 mL/hr (10/24/23 2358)      Data Review:  Results from last 7 days   Lab Units 10/25/23  0424 10/24/23  0445 10/23/23  1005   SODIUM mmol/L 137 137 136   BUN mg/dL 8 15 14   CREATININE mg/dL 0.87 0.77 0.80   GLUCOSE mg/dL 143* 204* 165*             Estimated Creatinine Clearance: 145.7 mL/min (by C-G formula based on SCr of 0.87 mg/dL).  Results from last 7 days   Lab Units 10/25/23  0424 10/24/23  0445 10/23/23  1005   WBC 10*3/mm3 11.60* 15.44* 13.56*   HEMOGLOBIN g/dL 13.2 13.9 14.8         Results from last 7 days   Lab Units 10/23/23  1005   ALT (SGPT) U/L 22   AST (SGOT) U/L 10     No results found for: \"DIGOXIN\"   No results found for: \"TSH\"  Results from last 7 days   Lab Units 10/25/23  0424 10/24/23  0445   CHOLESTEROL mg/dL 132 143   HDL CHOL mg/dL 41 36*               Imaging (last 24 hr):   Imaging Results (Last 24 Hours)       ** No results found for the last 24 hours. **              ASSESSMENT:     Unstable angina        PLAN:  1.  Status post PCI to the circumflex, clinically stable overnight.  No angina.  Vital signs stable.  He has residual disease in the distal LAD and distal circumflex which will be staged.  2.  Discussed the importance of dual antiplatelet therapy and compliance with medical therapy overall.  3.  Stable for discharge today  4.  Follow-up in the office in a couple of weeks, off work the rest of this week              Quique Bullock MD  10/25/2023    08:19 EDT           "

## 2023-10-26 ENCOUNTER — HOSPITAL ENCOUNTER (OUTPATIENT)
Facility: HOSPITAL | Age: 38
Discharge: HOME | End: 2023-10-26
Admitting: NURSE PRACTITIONER
Payer: COMMERCIAL

## 2023-10-26 ENCOUNTER — OFFICE VISIT (OUTPATIENT)
Dept: CARDIOLOGY | Facility: CLINIC | Age: 38
End: 2023-10-26
Payer: COMMERCIAL

## 2023-10-26 VITALS
WEIGHT: 261 LBS | HEIGHT: 67 IN | SYSTOLIC BLOOD PRESSURE: 127 MMHG | BODY MASS INDEX: 40.97 KG/M2 | DIASTOLIC BLOOD PRESSURE: 93 MMHG | HEART RATE: 73 BPM

## 2023-10-26 DIAGNOSIS — I10 HYPERTENSION, ESSENTIAL: ICD-10-CM

## 2023-10-26 DIAGNOSIS — Z98.890 S/P CARDIAC CATH: Primary | ICD-10-CM

## 2023-10-26 DIAGNOSIS — I25.708 CORONARY ARTERY DISEASE OF BYPASS GRAFT OF NATIVE HEART WITH STABLE ANGINA PECTORIS: ICD-10-CM

## 2023-10-26 DIAGNOSIS — Z98.890 S/P CARDIAC CATH: ICD-10-CM

## 2023-10-26 LAB — BH CV RIGHT GROIN PSA PROCEDURE SCRIPTING LRR: 1

## 2023-10-26 PROCEDURE — 93931 UPPER EXTREMITY STUDY: CPT

## 2023-10-26 NOTE — PROGRESS NOTES
Chief Complaint  Coronary Artery Disease and Follow-up (Cath procedure pain/knot)    Subjective            Deo Pacheco presents to [facility] CARDIOLOGY  History of Present Illness    Mr. Pacheco is here for follow-up evaluation management of coronary artery disease, essential hypertension, mixed hyperlipidemia.  On 10/23 he presented to the emergency room with acute severe chest pain.  Coronary angiography was completed and he received PCI to the circumflex.  There was residual disease in the distal LAD and distal circumflex which will be staged.  He comes in today for early follow-up due to pain in his right lower arm around radial access site.    PMH  Past Medical History:   Diagnosis Date    Coronary artery disease     FOLLOWS W/CEDILLO, NO CP OR SOA    Epilepsy with status epilepticus 2015    NO ISSUES SINCE    Hypertension     Knee pain     LEFT    Knee sprain 2\28\23    Myocardial infarction 2019    STENT PLACED         SURGICALHX  Past Surgical History:   Procedure Laterality Date    CARDIAC CATHETERIZATION      CARDIAC CATHETERIZATION N/A 10/24/2023    Procedure: Left Heart Cath;  Surgeon: Robinson Lay MD;  Location: [facility] CATH INVASIVE LOCATION;  Service: Cardiology;  Laterality: N/A;    CORONARY ANGIOPLASTY WITH STENT PLACEMENT Right 2019    KIDNEY STONE SURGERY      KNEE ARTHROSCOPY W/ MENISCECTOMY Left 6/15/2023    Procedure: KNEE ARTHROSCOPY WITH PARTIAL LATERAL MENISCECTOMY;  Surgeon: Suraj Nair MD;  [place] [facility] [place] [facility]  Service: Orthopedics;  Laterality: Left;        SOC  Social History     Socioeconomic History    Marital status: Significant Other   Tobacco Use    Smoking status: Every Day     Packs/day: 0.50     Years: 10.00     Additional pack years: 0.00     Total pack years: 5.00     Types: Cigarettes     Start date: 1/1/2007    Smokeless tobacco: Never   Vaping Use    Vaping Use: Never used   Substance and Sexual Activity    Alcohol use: Never     "Drug use: Never    Sexual activity: Defer         FAMHX  Family History   Problem Relation Age of Onset    Cancer Mother     Diabetes Father     Cancer Father           ALLERGY  No Known Allergies     MEDSCURRENT    Current Outpatient Medications:     amLODIPine (NORVASC) 5 MG tablet, Take 1 tablet by mouth Daily., Disp: , Rfl:     aspirin (aspirin) 81 MG EC tablet, Take 1 tablet by mouth Daily., Disp: 90 tablet, Rfl: 33    atorvastatin (LIPITOR) 40 MG tablet, Take 1 tablet by mouth Daily., Disp: , Rfl:     buPROPion SR (WELLBUTRIN SR) 150 MG 12 hr tablet, Take 1 tablet by mouth Daily., Disp: , Rfl:     isosorbide mononitrate (IMDUR) 30 MG 24 hr tablet, Take 1 tablet by mouth Daily., Disp: 90 tablet, Rfl: 1    losartan (COZAAR) 100 MG tablet, Take 1 tablet by mouth Daily., Disp: , Rfl:     metFORMIN (GLUCOPHAGE) 500 MG tablet, Take 1 tablet by mouth 2 (Two) Times a Day With Meals., Disp: 60 tablet, Rfl: 0    nitroglycerin (Nitrostat) 0.4 MG SL tablet, Place 1 tablet under the tongue Every 5 (Five) Minutes As Needed for Chest Pain. Take no more than 3 doses in 15 minutes., Disp: 25 tablet, Rfl: 12    ranolazine (RANEXA) 500 MG 12 hr tablet, Take 1 tablet by mouth Every 12 (Twelve) Hours., Disp: 180 tablet, Rfl: 1    ticagrelor (BRILINTA) 90 MG tablet tablet, Take 1 tablet by mouth 2 (Two) Times a Day., Disp: 180 tablet, Rfl: 3    ticagrelor (Brilinta) 90 MG tablet tablet, Take 1 tablet by mouth 2 (Two) Times a Day., Disp: 8 tablet, Rfl: 0  No current facility-administered medications for this visit.      Review of Systems   Constitutional: Negative for malaise/fatigue.   Cardiovascular:  Negative for chest pain.   Respiratory:  Negative for shortness of breath.         Objective     /93   Pulse 73   Ht 170.2 cm (67.01\")   Wt 118 kg (261 lb)   BMI 40.87 kg/m²       General Appearance:   well developed  well nourished  HENT:   oropharynx moist  lips not cyanotic  Neck:  thyroid not " enlarged  supple  Respiratory:  no respiratory distress  normal breath sounds  no rales  Cardiovascular:  no jugular venous distention  regular rhythm  apical impulse normal  S1 normal, S2 normal  no S3, no S4   no murmur  no rub, no thrill  carotid pulses normal; no bruit  pedal pulses normal  lower extremity edema: none    Musculoskeletal:  no clubbing of fingers.   normocephalic, head atraumatic  Skin:   warm, dry  Bruising and generalized edema to right upper extremity, palpable pulse, normal perfusion to distal digits  Psychiatric:  judgement and insight appropriate  normal mood and affect      Result Review :     The following data was reviewed by: ELVIN Bobo on 10/26/2023:        Data reviewed : Cardiology studies hospital notes reviewed.      Procedures      Deo Pacheco  reports that he has been smoking cigarettes. He started smoking about 16 years ago. He has a 5.00 pack-year smoking history. He has never used smokeless tobacco.. I have educated him on the risk of diseases from using tobacco products such as cancer, COPD, and heart disease.     I advised him to quit and he is not willing to quit.    I spent 3  minutes counseling the patient.                Assessment and Plan        ASSESSMENT:  Encounter Diagnoses   Name Primary?    S/P cardiac cath Yes    Coronary artery disease of bypass graft of native heart with stable angina pectoris     Hypertension, essential          PLAN:    1.  Coronary artery disease status post PCI-Mr. Pacheco has developed edema and bruising to the right upper extremity, in the lower aspect of that arm.  On my exam, the radial pulse is palpable and there is normal perfusion in his digits on the affected side.  I am going to order an ultrasound for further evaluation.     He has not yet picked up his Brilinta.  We discussed today that it is very important not to miss any Brilinta or aspirin doses for risk of stent occlusion.  We will give a sample  bottle and he will go to the pharmacy today.    2.  Essential hypertension-controlled, continue current medical therapy.  3.  We will follow-up after ultrasound.  If ultrasound is normal, will follow-up as scheduled in a couple of weeks.          Patient was given instructions and counseling regarding his condition or for health maintenance advice. Please see specific information pulled into the AVS if appropriate.           Sommer Berumen, ELVIN   10/26/2023  13:30 EDT

## 2023-11-16 ENCOUNTER — PREP FOR SURGERY (OUTPATIENT)
Dept: OTHER | Facility: HOSPITAL | Age: 38
End: 2023-11-16
Payer: COMMERCIAL

## 2023-11-16 ENCOUNTER — OFFICE VISIT (OUTPATIENT)
Dept: CARDIOLOGY | Facility: CLINIC | Age: 38
End: 2023-11-16
Payer: COMMERCIAL

## 2023-11-16 VITALS
DIASTOLIC BLOOD PRESSURE: 118 MMHG | HEART RATE: 83 BPM | HEIGHT: 67 IN | WEIGHT: 269 LBS | SYSTOLIC BLOOD PRESSURE: 157 MMHG | BODY MASS INDEX: 42.22 KG/M2

## 2023-11-16 DIAGNOSIS — I25.708 CORONARY ARTERY DISEASE OF BYPASS GRAFT OF NATIVE HEART WITH STABLE ANGINA PECTORIS: Primary | ICD-10-CM

## 2023-11-16 DIAGNOSIS — E78.2 HYPERLIPEMIA, MIXED: ICD-10-CM

## 2023-11-16 DIAGNOSIS — I10 HYPERTENSION, ESSENTIAL: ICD-10-CM

## 2023-11-16 RX ORDER — SODIUM CHLORIDE 9 MG/ML
40 INJECTION, SOLUTION INTRAVENOUS AS NEEDED
OUTPATIENT
Start: 2023-11-16

## 2023-11-16 RX ORDER — SODIUM CHLORIDE 0.9 % (FLUSH) 0.9 %
10 SYRINGE (ML) INJECTION AS NEEDED
OUTPATIENT
Start: 2023-11-16

## 2023-11-16 RX ORDER — SODIUM CHLORIDE 0.9 % (FLUSH) 0.9 %
10 SYRINGE (ML) INJECTION EVERY 12 HOURS SCHEDULED
OUTPATIENT
Start: 2023-11-16

## 2023-11-16 RX ORDER — ISOSORBIDE MONONITRATE 60 MG/1
60 TABLET, EXTENDED RELEASE ORAL
Qty: 90 TABLET | Refills: 3 | Status: SHIPPED | OUTPATIENT
Start: 2023-11-16

## 2023-11-16 RX ORDER — CARVEDILOL 3.12 MG/1
3.12 TABLET ORAL 2 TIMES DAILY
Qty: 180 TABLET | Refills: 3 | Status: SHIPPED | OUTPATIENT
Start: 2023-11-16

## 2023-11-16 NOTE — PROGRESS NOTES
Chief Complaint  [facility] FOLLOW UP     Subjective            Deo Pacheco presents to [facility] CARDIOLOGY  History of Present Illness    Mr. Pacheco is here today for follow-up evaluation management of coronary artery disease, essential pretension, mixed hyperlipidemia.  On 10/23 he presented to emergency room with acute severe chest pain.  Coronary angiography was completed which showed occluded OM 1 and he received PCI.  There was residual severe stenosis of the mid left circumflex artery and distal LAD.  Today he reports continued exertional chest pressure and dyspnea.  He is compliant with his medical therapy.    [facility]  Past Medical History:   Diagnosis Date    Coronary artery disease     FOLLOWS W/CEDILLO, NO CP OR SOA    Epilepsy with status epilepticus     NO ISSUES SINCE    Hypertension     Knee pain     LEFT    Knee sprain     Myocardial infarction 2019    STENT PLACED         SURGICALHX  Past Surgical History:   Procedure Laterality Date    CARDIAC CATHETERIZATION      CARDIAC CATHETERIZATION N/A 10/24/2023    Procedure: Left Heart Cath;  Surgeon: Robinson Lay MD;  Location: [facility] CATH INVASIVE LOCATION;  Service: Cardiology;  Laterality: N/A;    CORONARY ANGIOPLASTY WITH STENT PLACEMENT Right     KIDNEY STONE SURGERY      KNEE ARTHROSCOPY W/ MENISCECTOMY Left 6/15/2023    Procedure: KNEE ARTHROSCOPY WITH PARTIAL LATERAL MENISCECTOMY;  Surgeon: Suraj Nair MD;  Location: [facility] OR [facility];  Service: Orthopedics;  Laterality: Left;        SOC  Social History     Socioeconomic History    Marital status: Significant Other   Tobacco Use    Smoking status: Former     Packs/day: 0.50     Years: 10.00     Additional pack years: 0.00     Total pack years: 5.00     Types: Cigarettes     Start date: 2007     Quit date: 10/23/2023     Years since quittin.0    Smokeless tobacco: Never   Vaping Use    Vaping Use: Never used   Substance and Sexual Activity    Alcohol  use: Never    Drug use: Never    Sexual activity: Defer         FAMHX  Family History   Problem Relation Age of Onset    Cancer Mother     Diabetes Father     Cancer Father           ALLERGY  No Known Allergies     MEDSCURRENT    Current Outpatient Medications:     amLODIPine (NORVASC) 5 MG tablet, Take 1 tablet by mouth Daily., Disp: , Rfl:     aspirin (aspirin) 81 MG EC tablet, Take 1 tablet by mouth Daily., Disp: 90 tablet, Rfl: 33    atorvastatin (LIPITOR) 40 MG tablet, Take 1 tablet by mouth Daily., Disp: , Rfl:     buPROPion SR (WELLBUTRIN SR) 150 MG 12 hr tablet, Take 1 tablet by mouth Daily., Disp: , Rfl:     isosorbide mononitrate (IMDUR) 60 MG 24 hr tablet, Take 1 tablet by mouth Daily., Disp: 90 tablet, Rfl: 3    losartan (COZAAR) 100 MG tablet, Take 1 tablet by mouth Daily., Disp: , Rfl:     metFORMIN (GLUCOPHAGE) 500 MG tablet, Take 1 tablet by mouth 2 (Two) Times a Day With Meals., Disp: 60 tablet, Rfl: 0    nitroglycerin (Nitrostat) 0.4 MG SL tablet, Place 1 tablet under the tongue Every 5 (Five) Minutes As Needed for Chest Pain. Take no more than 3 doses in 15 minutes., Disp: 25 tablet, Rfl: 12    ranolazine (RANEXA) 500 MG 12 hr tablet, Take 1 tablet by mouth Every 12 (Twelve) Hours., Disp: 180 tablet, Rfl: 1    ticagrelor (BRILINTA) 90 MG tablet tablet, Take 1 tablet by mouth 2 (Two) Times a Day., Disp: 180 tablet, Rfl: 3    ticagrelor (Brilinta) 90 MG tablet tablet, Take 1 tablet by mouth 2 (Two) Times a Day., Disp: 8 tablet, Rfl: 0    ticagrelor (Brilinta) 90 MG tablet tablet, Take 1 tablet by mouth 2 (Two) Times a Day., Disp: 8 tablet, Rfl: 0    carvedilol (COREG) 3.125 MG tablet, Take 1 tablet by mouth 2 (Two) Times a Day., Disp: 180 tablet, Rfl: 3      Review of Systems   Constitutional: Negative for malaise/fatigue.   Cardiovascular:  Positive for chest pain and dyspnea on exertion. Negative for irregular heartbeat, leg swelling, near-syncope and palpitations.   Respiratory:  Positive for  "shortness of breath.    Neurological:  Negative for dizziness.        Objective     BP (!) 157/118   Pulse 83   Ht 170.2 cm (67.01\")   Wt 122 kg (269 lb)   BMI 42.12 kg/m²       General Appearance:   well developed  well nourished  HENT:   oropharynx moist  lips not cyanotic  Neck:  thyroid not enlarged  supple  Respiratory:  no respiratory distress  normal breath sounds  no rales  Cardiovascular:  no jugular venous distention  regular rhythm  apical impulse normal  S1 normal, S2 normal  no S3, no S4   no murmur  no rub, no thrill  carotid pulses normal; no bruit  pedal pulses normal  lower extremity edema: none    Musculoskeletal:  no clubbing of fingers.   normocephalic, head atraumatic  Skin:   warm, dry  Psychiatric:  judgement and insight appropriate  normal mood and affect      Result Review :     The following data was reviewed by: ELVIN Bobo on 11/16/2023:    CMP          10/23/2023    10:05 10/24/2023    04:45 10/25/2023    04:24   CMP   Glucose 165  204  143    BUN 14  15  8    Creatinine 0.80  0.77  0.87    EGFR 118.4  119.7  115.4    Sodium 136  137  137    Potassium 3.5  3.5  3.6    Chloride 103  104  104    Calcium 8.6  8.1  8.1    Total Protein 6.6      Albumin 4.1      Globulin 2.5      Total Bilirubin 0.5      Alkaline Phosphatase 101      AST (SGOT) 10      ALT (SGPT) 22      Albumin/Globulin Ratio 1.6      BUN/Creatinine Ratio 17.5  19.5  9.2    Anion Gap 10.7  11.5  9.7      CBC          10/23/2023    10:05 10/24/2023    04:45 10/25/2023    04:24   CBC   WBC 13.56  15.44  11.60    RBC 5.01  4.68  4.46    Hemoglobin 14.8  13.9  13.2    Hematocrit 43.3  41.0  39.1    MCV 86.4  87.6  87.7    MCH 29.5  29.7  29.6    MCHC 34.2  33.9  33.8    RDW 13.3  13.2  13.2    Platelets 283  262  233      Lipid Panel          10/24/2023    04:45 10/25/2023    04:24   Lipid Panel   Total Cholesterol 143  132    Triglycerides 200  81    HDL Cholesterol 36  41    VLDL Cholesterol 34  16    LDL " Cholesterol  73  75    LDL/HDL Ratio 1.86  1.82          Data reviewed : Cardiology studies hospital notes reviewed as above.      Procedures      Deo Pacheco  reports that he quit smoking about 3 weeks ago. His smoking use included cigarettes. He started smoking about 16 years ago. He has a 5.00 pack-year smoking history. He has never used smokeless tobacco.. I have educated him on the risk of diseases from using tobacco products such as cancer, COPD, and heart disease.              Assessment and Plan        ASSESSMENT:  Encounter Diagnoses   Name Primary?    Coronary artery disease of bypass graft of native heart with stable angina pectoris Yes    Hypertension, essential     Hyperlipemia, mixed          PLAN:    1.  Coronary artery disease with recent PCI-there was residual disease in the mid left circumflex artery and distal LAD.  He continues to have dyspnea and chest pressure on exertion and overall fatigue.  I am going to increase long-acting nitroglycerin.  Add beta-blocker.  Continue Ranexa and dual antiplatelet therapy.  We will schedule for staged PCI.  2.  Essential hypertension-uncontrolled.  Start carvedilol today.  Increasing long-acting nitroglycerin as well.  3.  Mixed hyperlipidemia stable on statin therapy.    Follow-up to be arranged.      Patient was given instructions and counseling regarding his condition or for health maintenance advice. Please see specific information pulled into the AVS if appropriate.           Sommer Berumen, APRN   11/16/2023  13:44 EST

## 2023-11-16 NOTE — H&P (VIEW-ONLY)
Chief Complaint  [facility] FOLLOW UP     Subjective            Deo Pacheco presents to [facility] CARDIOLOGY  History of Present Illness    Mr. Pacheco is here today for follow-up evaluation management of coronary artery disease, essential pretension, mixed hyperlipidemia.  On 10/23 he presented to emergency room with acute severe chest pain.  Coronary angiography was completed which showed occluded OM 1 and he received PCI.  There was residual severe stenosis of the mid left circumflex artery and distal LAD.  Today he reports continued exertional chest pressure and dyspnea.  He is compliant with his medical therapy.    [facility]  Past Medical History:   Diagnosis Date    Coronary artery disease     FOLLOWS W/CEDILLO, NO CP OR SOA    Epilepsy with status epilepticus     NO ISSUES SINCE    Hypertension     Knee pain     LEFT    Knee sprain     Myocardial infarction 2019    STENT PLACED         SURGICALHX  Past Surgical History:   Procedure Laterality Date    CARDIAC CATHETERIZATION      CARDIAC CATHETERIZATION N/A 10/24/2023    Procedure: Left Heart Cath;  Surgeon: Robinson Lay MD;  Location: [facility] CATH INVASIVE LOCATION;  Service: Cardiology;  Laterality: N/A;    CORONARY ANGIOPLASTY WITH STENT PLACEMENT Right     KIDNEY STONE SURGERY      KNEE ARTHROSCOPY W/ MENISCECTOMY Left 6/15/2023    Procedure: KNEE ARTHROSCOPY WITH PARTIAL LATERAL MENISCECTOMY;  Surgeon: Suraj Nair MD;  [place] [facility] [place] [facility]  Service: Orthopedics;  Laterality: Left;        SOC  Social History     Socioeconomic History    Marital status: Significant Other   Tobacco Use    Smoking status: Former     Packs/day: 0.50     Years: 10.00     Additional pack years: 0.00     Total pack years: 5.00     Types: Cigarettes     Start date: 2007     Quit date: 10/23/2023     Years since quittin.0    Smokeless tobacco: Never   Vaping Use    Vaping Use: Never used   Substance and Sexual Activity    Alcohol  use: Never    Drug use: Never    Sexual activity: Defer         FAMHX  Family History   Problem Relation Age of Onset    Cancer Mother     Diabetes Father     Cancer Father           ALLERGY  No Known Allergies     MEDSCURRENT    Current Outpatient Medications:     amLODIPine (NORVASC) 5 MG tablet, Take 1 tablet by mouth Daily., Disp: , Rfl:     aspirin (aspirin) 81 MG EC tablet, Take 1 tablet by mouth Daily., Disp: 90 tablet, Rfl: 33    atorvastatin (LIPITOR) 40 MG tablet, Take 1 tablet by mouth Daily., Disp: , Rfl:     buPROPion SR (WELLBUTRIN SR) 150 MG 12 hr tablet, Take 1 tablet by mouth Daily., Disp: , Rfl:     isosorbide mononitrate (IMDUR) 60 MG 24 hr tablet, Take 1 tablet by mouth Daily., Disp: 90 tablet, Rfl: 3    losartan (COZAAR) 100 MG tablet, Take 1 tablet by mouth Daily., Disp: , Rfl:     metFORMIN (GLUCOPHAGE) 500 MG tablet, Take 1 tablet by mouth 2 (Two) Times a Day With Meals., Disp: 60 tablet, Rfl: 0    nitroglycerin (Nitrostat) 0.4 MG SL tablet, Place 1 tablet under the tongue Every 5 (Five) Minutes As Needed for Chest Pain. Take no more than 3 doses in 15 minutes., Disp: 25 tablet, Rfl: 12    ranolazine (RANEXA) 500 MG 12 hr tablet, Take 1 tablet by mouth Every 12 (Twelve) Hours., Disp: 180 tablet, Rfl: 1    ticagrelor (BRILINTA) 90 MG tablet tablet, Take 1 tablet by mouth 2 (Two) Times a Day., Disp: 180 tablet, Rfl: 3    ticagrelor (Brilinta) 90 MG tablet tablet, Take 1 tablet by mouth 2 (Two) Times a Day., Disp: 8 tablet, Rfl: 0    ticagrelor (Brilinta) 90 MG tablet tablet, Take 1 tablet by mouth 2 (Two) Times a Day., Disp: 8 tablet, Rfl: 0    carvedilol (COREG) 3.125 MG tablet, Take 1 tablet by mouth 2 (Two) Times a Day., Disp: 180 tablet, Rfl: 3      Review of Systems   Constitutional: Negative for malaise/fatigue.   Cardiovascular:  Positive for chest pain and dyspnea on exertion. Negative for irregular heartbeat, leg swelling, near-syncope and palpitations.   Respiratory:  Positive for  "shortness of breath.    Neurological:  Negative for dizziness.        Objective     BP (!) 157/118   Pulse 83   Ht 170.2 cm (67.01\")   Wt 122 kg (269 lb)   BMI 42.12 kg/m²       General Appearance:   well developed  well nourished  HENT:   oropharynx moist  lips not cyanotic  Neck:  thyroid not enlarged  supple  Respiratory:  no respiratory distress  normal breath sounds  no rales  Cardiovascular:  no jugular venous distention  regular rhythm  apical impulse normal  S1 normal, S2 normal  no S3, no S4   no murmur  no rub, no thrill  carotid pulses normal; no bruit  pedal pulses normal  lower extremity edema: none    Musculoskeletal:  no clubbing of fingers.   normocephalic, head atraumatic  Skin:   warm, dry  Psychiatric:  judgement and insight appropriate  normal mood and affect      Result Review :     The following data was reviewed by: ELVIN Bobo on 11/16/2023:    CMP          10/23/2023    10:05 10/24/2023    04:45 10/25/2023    04:24   CMP   Glucose 165  204  143    BUN 14  15  8    Creatinine 0.80  0.77  0.87    EGFR 118.4  119.7  115.4    Sodium 136  137  137    Potassium 3.5  3.5  3.6    Chloride 103  104  104    Calcium 8.6  8.1  8.1    Total Protein 6.6      Albumin 4.1      Globulin 2.5      Total Bilirubin 0.5      Alkaline Phosphatase 101      AST (SGOT) 10      ALT (SGPT) 22      Albumin/Globulin Ratio 1.6      BUN/Creatinine Ratio 17.5  19.5  9.2    Anion Gap 10.7  11.5  9.7      CBC          10/23/2023    10:05 10/24/2023    04:45 10/25/2023    04:24   CBC   WBC 13.56  15.44  11.60    RBC 5.01  4.68  4.46    Hemoglobin 14.8  13.9  13.2    Hematocrit 43.3  41.0  39.1    MCV 86.4  87.6  87.7    MCH 29.5  29.7  29.6    MCHC 34.2  33.9  33.8    RDW 13.3  13.2  13.2    Platelets 283  262  233      Lipid Panel          10/24/2023    04:45 10/25/2023    04:24   Lipid Panel   Total Cholesterol 143  132    Triglycerides 200  81    HDL Cholesterol 36  41    VLDL Cholesterol 34  16    LDL " Cholesterol  73  75    LDL/HDL Ratio 1.86  1.82          Data reviewed : Cardiology studies hospital notes reviewed as above.      Procedures      Deo Pacheco  reports that he quit smoking about 3 weeks ago. His smoking use included cigarettes. He started smoking about 16 years ago. He has a 5.00 pack-year smoking history. He has never used smokeless tobacco.. I have educated him on the risk of diseases from using tobacco products such as cancer, COPD, and heart disease.              Assessment and Plan        ASSESSMENT:  Encounter Diagnoses   Name Primary?    Coronary artery disease of bypass graft of native heart with stable angina pectoris Yes    Hypertension, essential     Hyperlipemia, mixed          PLAN:    1.  Coronary artery disease with recent PCI-there was residual disease in the mid left circumflex artery and distal LAD.  He continues to have dyspnea and chest pressure on exertion and overall fatigue.  I am going to increase long-acting nitroglycerin.  Add beta-blocker.  Continue Ranexa and dual antiplatelet therapy.  We will schedule for staged PCI.  2.  Essential hypertension-uncontrolled.  Start carvedilol today.  Increasing long-acting nitroglycerin as well.  3.  Mixed hyperlipidemia stable on statin therapy.    Follow-up to be arranged.      Patient was given instructions and counseling regarding his condition or for health maintenance advice. Please see specific information pulled into the AVS if appropriate.           Sommer Berumen, APRN   11/16/2023  13:44 EST

## 2023-11-17 ENCOUNTER — OFFICE VISIT (OUTPATIENT)
Dept: ORTHOPEDIC SURGERY | Facility: CLINIC | Age: 38
End: 2023-11-17
Payer: COMMERCIAL

## 2023-11-17 VITALS — HEIGHT: 67 IN | BODY MASS INDEX: 42.22 KG/M2 | WEIGHT: 269 LBS

## 2023-11-17 DIAGNOSIS — G89.29 CHRONIC PAIN OF RIGHT KNEE: Primary | ICD-10-CM

## 2023-11-17 DIAGNOSIS — Q68.6 DISCOID LATERAL MENISCUS OF RIGHT KNEE: ICD-10-CM

## 2023-11-17 DIAGNOSIS — M25.561 CHRONIC PAIN OF RIGHT KNEE: Primary | ICD-10-CM

## 2023-11-17 DIAGNOSIS — M17.11 PRIMARY OSTEOARTHRITIS OF RIGHT KNEE: ICD-10-CM

## 2023-11-17 NOTE — PROGRESS NOTES
"Chief Complaint  Follow-up and Pain of the Right Knee    Subjective      Deo Pacheco presents to [facility] ORTHOPEDICS for follow-up of right knee pain.  Previous MRI revealed subacute to chronic nondisplaced fracture of the fibular head and neck, high signal focus in the distal femoral metaphysis favored to represent endochondroma; discoid lateral meniscus; 2.20 cm x 1.1 cm ganglion predominantly along the posterior aspect of the ACL but with extension into the ACL; and early arthritic changes.  He was last seen in office on 10/16/2023 and received a right knee steroid injection.  He presents today independently ambulatory without use of assistive device.  States that he has discontinued outpatient physical therapy and that he has started process for disability.  Reports that he was hospitalized for \"MI last month\".  He has been using topical gabapentin cream with minimal relief to described burning pain to his right knee.  He does report that he had significant relief following previous right knee steroid injection.  He is currently on antiplatelet therapy.    Objective   No Known Allergies    Vital Signs:   Ht 170.2 cm (67\")   Wt 122 kg (269 lb)   BMI 42.13 kg/m²       Physical Exam    Constitutional: Awake, alert. Well nourished appearance.    Integumentary: Warm, dry, intact. No obvious rashes.    HENT: Atraumatic, normocephalic.   Respiratory: Non labored respirations .   Cardiovascular: Intact peripheral pulses.    Psychiatric: Normal mood and affect. A&O X3    Ortho Exam  Right knee: Skin is warm, dry, and intact.  Patella is well tracking and knee is noted varus and valgus stress.  Tenderness to palpation medial joint line.  Negative Lachman's.  Full knee extension and flexion to 120 degrees.  Full plantarflexion and dorsiflexion of the ankle.  Sensation intact light touch.  Distal neurovascular intact.  Smooth sit to stand transition.  Patient fully weightbearing with " nonantalgic gait.    Imaging Results (Most Recent)       None                    Assessment and Plan   Problem List Items Addressed This Visit    None  Visit Diagnoses       Chronic pain of right knee    -  Primary    Discoid lateral meniscus of right knee        Primary osteoarthritis of right knee              Follow Up   No follow-ups on file.    Tobacco Use: Medium Risk (11/17/2023)    Patient History     Smoking Tobacco Use: Former     Smokeless Tobacco Use: Never     Passive Exposure: Not on file     Educated on risk of smoking. Discussed options for smoking cessation.    Patient Instructions   Patient with continued improvement. He has completed PT. Advised to continue home exercises.     Continue icing knee as needed up to 4 times daily for no longer than 15-20 mins at a time.     Follow-up as needed. Call with changes or concerns.     Patient was given instructions and counseling regarding his condition or for health maintenance advice. Please see specific information pulled into the AVS if appropriate.

## 2023-11-17 NOTE — PATIENT INSTRUCTIONS
Patient with continued improvement. He has completed PT. Advised to continue home exercises.     Continue icing knee as needed up to 4 times daily for no longer than 15-20 mins at a time.     Follow-up as needed. Call with changes or concerns.

## 2023-11-20 ENCOUNTER — TELEPHONE (OUTPATIENT)
Dept: CARDIOLOGY | Facility: CLINIC | Age: 38
End: 2023-11-20
Payer: COMMERCIAL

## 2023-11-20 NOTE — TELEPHONE ENCOUNTER
I spoke to patient and gave an arrival time of 9:30 on 11/28/23 for [facility]. Patient was instructed to have a  for the day of the procedure and to arrive at the main entrance/registration area. Patient was educated about stent placement and informed that in the event of stent placement, the patient will be required to stay overnight for observation. Patient was instructed to hold Metformin for 24 hours prior to procedure. Patient was instructed to continue all other medications as usual. Patient was instructed to be NPO after midnight with sips of water as needed. Patient is agreeable with no other questions or concerns.

## 2023-11-22 NOTE — PRE-PROCEDURE INSTRUCTIONS
PATIENT INSTRUCTED TO BE:    - NPO AFTER MIDNIGHT EXCEPT CAN HAVE SIPS OF WATER WITH HIS MEDICATION PRIOR TO PROCEDURE    -  INSTRUCTED NO LOTIONS, JEWELRY, PIERCINGS, OR DEODORANT DAY OF THE PROCEDURE    - INSTRUCTED TO TAKE THE FOLLOWING MEDICATIONS THE DAY OF SURGERY:       AS DIRECTED BY CARDIOLOGY  INST TO HOLD METFORMIN, LAST DOSE 11/26/23 P,    - INSTRUCTED PT TO FOLLOW ANY INSTRUCTIONS GIVEN BY HIS CARDIOLOGIST.    - INFORMED PT THEY ATTEMPT RADIAL APPROACH FIRST IF UNABLE TO PERFORM CATH WITH THAT APPROACH THEY WILL DO A FEMORAL APPROACH.  ALSO, INFORMED PT THEY WILL BE ON BEDREST POSTOP.  IF THE SURGEON FEELS  AN INTERVENTION OR STENTS NEEDS TO BE PLACED, HE/SHE WILL STAY OVER NIGHT FOR OBSERVATION AND MONITORING.     - INFORMED THE PATIENT HE CAN HAVE TWO VISITORS WITH HIM THE DAY OF THE PROCEDURE    - INSTRUCTED PT TO PARK IN THE PARKING GARAGE, ENTER THE HOSPITAL THRU ENTRANCE A AND  CHECK IN AT THE MAIN REGISTRATION DESK, AFTER REGISTRATION PT WILL BE TAKEN THE THE PREOP AREA FOR HIS PROCEDURE.    -ARRIVAL TIME GIVEN BY CARDIOLOGIST OFFICE, INFORMED PT IF ARRIVAL TIME CHANGES OR ADJUSTMENTS NEED TO BE MADE IN THEIR ARRIVAL TIME, HE/SHE WOULD RECEIVE A CALL.    -INSTRUCTED PT TO HAVE LABS PRIOR TO PROCEDURE      - PATIENT VERBALIZED UNDERSTANDING

## 2023-11-28 ENCOUNTER — HOSPITAL ENCOUNTER (OUTPATIENT)
Facility: HOSPITAL | Age: 38
Discharge: HOME | End: 2023-11-29
Attending: INTERNAL MEDICINE | Admitting: INTERNAL MEDICINE
Payer: COMMERCIAL

## 2023-11-28 DIAGNOSIS — I25.708 CORONARY ARTERY DISEASE OF BYPASS GRAFT OF NATIVE HEART WITH STABLE ANGINA PECTORIS: ICD-10-CM

## 2023-11-28 PROBLEM — Z98.61 CAD S/P PERCUTANEOUS CORONARY ANGIOPLASTY: Status: ACTIVE | Noted: 2023-11-28

## 2023-11-28 PROBLEM — I25.10 CAD S/P PERCUTANEOUS CORONARY ANGIOPLASTY: Status: ACTIVE | Noted: 2023-11-28

## 2023-11-28 LAB
ACT BLD: 255 SECONDS (ref 89–137)
ANION GAP SERPL CALCULATED.3IONS-SCNC: 11 MMOL/L (ref 5–15)
BUN SERPL-MCNC: 11 MG/DL (ref 6–20)
BUN/CREAT SERPL: 13.1 (ref 7–25)
CALCIUM SPEC-SCNC: 8.5 MG/DL (ref 8.6–10.5)
CHLORIDE SERPL-SCNC: 103 MMOL/L (ref 98–107)
CO2 SERPL-SCNC: 24 MMOL/L (ref 22–29)
CREAT SERPL-MCNC: 0.84 MG/DL (ref 0.76–1.27)
DEPRECATED RDW RBC AUTO: 39.9 FL (ref 37–54)
EGFRCR SERPLBLD CKD-EPI 2021: 115.9 ML/MIN/1.73
ERYTHROCYTE [DISTWIDTH] IN BLOOD BY AUTOMATED COUNT: 12.7 % (ref 12.3–15.4)
GLUCOSE SERPL-MCNC: 148 MG/DL (ref 65–99)
HCT VFR BLD AUTO: 41.3 % (ref 37.5–51)
HGB BLD-MCNC: 14.1 G/DL (ref 13–17.7)
INR PPP: 1.15 (ref 0.86–1.15)
MCH RBC QN AUTO: 29.4 PG (ref 26.6–33)
MCHC RBC AUTO-ENTMCNC: 34.1 G/DL (ref 31.5–35.7)
MCV RBC AUTO: 86 FL (ref 79–97)
PLATELET # BLD AUTO: 275 10*3/MM3 (ref 140–450)
PMV BLD AUTO: 9.3 FL (ref 6–12)
POTASSIUM SERPL-SCNC: 3.7 MMOL/L (ref 3.5–5.2)
PROTHROMBIN TIME: 14.9 SECONDS (ref 11.8–14.9)
RBC # BLD AUTO: 4.8 10*6/MM3 (ref 4.14–5.8)
SODIUM SERPL-SCNC: 138 MMOL/L (ref 136–145)
WBC NRBC COR # BLD AUTO: 8.55 10*3/MM3 (ref 3.4–10.8)

## 2023-11-28 PROCEDURE — 94799 UNLISTED PULMONARY SVC/PX: CPT

## 2023-11-28 PROCEDURE — 25010000002 HEPARIN (PORCINE) PER 1000 UNITS: Performed by: INTERNAL MEDICINE

## 2023-11-28 PROCEDURE — 99152 MOD SED SAME PHYS/QHP 5/>YRS: CPT | Performed by: INTERNAL MEDICINE

## 2023-11-28 PROCEDURE — C1769 GUIDE WIRE: HCPCS | Performed by: INTERNAL MEDICINE

## 2023-11-28 PROCEDURE — 25510000001 IOPAMIDOL PER 1 ML: Performed by: INTERNAL MEDICINE

## 2023-11-28 PROCEDURE — C1894 INTRO/SHEATH, NON-LASER: HCPCS | Performed by: INTERNAL MEDICINE

## 2023-11-28 PROCEDURE — 80048 BASIC METABOLIC PNL TOTAL CA: CPT

## 2023-11-28 PROCEDURE — 92928 PRQ TCAT PLMT NTRAC ST 1 LES: CPT | Performed by: INTERNAL MEDICINE

## 2023-11-28 PROCEDURE — 94761 N-INVAS EAR/PLS OXIMETRY MLT: CPT

## 2023-11-28 PROCEDURE — 85347 COAGULATION TIME ACTIVATED: CPT

## 2023-11-28 PROCEDURE — C1874 STENT, COATED/COV W/DEL SYS: HCPCS | Performed by: INTERNAL MEDICINE

## 2023-11-28 PROCEDURE — 25010000002 MIDAZOLAM PER 1MG: Performed by: INTERNAL MEDICINE

## 2023-11-28 PROCEDURE — C1887 CATHETER, GUIDING: HCPCS | Performed by: INTERNAL MEDICINE

## 2023-11-28 PROCEDURE — 85027 COMPLETE CBC AUTOMATED: CPT

## 2023-11-28 PROCEDURE — C9600 PERC DRUG-EL COR STENT SING: HCPCS | Performed by: INTERNAL MEDICINE

## 2023-11-28 PROCEDURE — 25010000002 FENTANYL CITRATE (PF) 100 MCG/2ML SOLUTION: Performed by: INTERNAL MEDICINE

## 2023-11-28 PROCEDURE — C1725 CATH, TRANSLUMIN NON-LASER: HCPCS | Performed by: INTERNAL MEDICINE

## 2023-11-28 PROCEDURE — 85610 PROTHROMBIN TIME: CPT

## 2023-11-28 DEVICE — XIENCE SKYPOINT™ EVEROLIMUS ELUTING CORONARY STENT SYSTEM 2.50 MM X 18 MM / RAPID-EXCHANGE
Type: IMPLANTABLE DEVICE | Status: FUNCTIONAL
Brand: XIENCE SKYPOINT™

## 2023-11-28 RX ORDER — VERAPAMIL HYDROCHLORIDE 2.5 MG/ML
INJECTION, SOLUTION INTRAVENOUS
Status: DISCONTINUED | OUTPATIENT
Start: 2023-11-28 | End: 2023-11-28 | Stop reason: HOSPADM

## 2023-11-28 RX ORDER — LIDOCAINE HYDROCHLORIDE 20 MG/ML
INJECTION, SOLUTION INFILTRATION; PERINEURAL
Status: DISCONTINUED | OUTPATIENT
Start: 2023-11-28 | End: 2023-11-28 | Stop reason: HOSPADM

## 2023-11-28 RX ORDER — CARVEDILOL 3.12 MG/1
3.12 TABLET ORAL 2 TIMES DAILY
Status: DISCONTINUED | OUTPATIENT
Start: 2023-11-28 | End: 2023-11-29 | Stop reason: HOSPADM

## 2023-11-28 RX ORDER — HEPARIN SODIUM 1000 [USP'U]/ML
INJECTION, SOLUTION INTRAVENOUS; SUBCUTANEOUS
Status: DISCONTINUED | OUTPATIENT
Start: 2023-11-28 | End: 2023-11-28 | Stop reason: HOSPADM

## 2023-11-28 RX ORDER — ATORVASTATIN CALCIUM 40 MG/1
40 TABLET, FILM COATED ORAL DAILY
Status: DISCONTINUED | OUTPATIENT
Start: 2023-11-28 | End: 2023-11-29 | Stop reason: HOSPADM

## 2023-11-28 RX ORDER — AMLODIPINE BESYLATE 5 MG/1
5 TABLET ORAL DAILY
Status: DISCONTINUED | OUTPATIENT
Start: 2023-11-28 | End: 2023-11-29 | Stop reason: HOSPADM

## 2023-11-28 RX ORDER — DICLOFENAC SODIUM 75 MG/1
75 TABLET, DELAYED RELEASE ORAL 2 TIMES DAILY
COMMUNITY
Start: 2023-11-22

## 2023-11-28 RX ORDER — MIDAZOLAM HYDROCHLORIDE 2 MG/2ML
INJECTION, SOLUTION INTRAMUSCULAR; INTRAVENOUS
Status: DISCONTINUED | OUTPATIENT
Start: 2023-11-28 | End: 2023-11-28 | Stop reason: HOSPADM

## 2023-11-28 RX ORDER — NITROGLYCERIN 0.4 MG/1
0.4 TABLET SUBLINGUAL
Status: DISCONTINUED | OUTPATIENT
Start: 2023-11-28 | End: 2023-11-28

## 2023-11-28 RX ORDER — ACETAMINOPHEN 325 MG/1
650 TABLET ORAL EVERY 4 HOURS PRN
Status: DISCONTINUED | OUTPATIENT
Start: 2023-11-28 | End: 2023-11-29 | Stop reason: HOSPADM

## 2023-11-28 RX ORDER — NITROGLYCERIN 0.4 MG/1
0.4 TABLET SUBLINGUAL
Status: DISCONTINUED | OUTPATIENT
Start: 2023-11-28 | End: 2023-11-29 | Stop reason: HOSPADM

## 2023-11-28 RX ORDER — SODIUM CHLORIDE 0.9 % (FLUSH) 0.9 %
10 SYRINGE (ML) INJECTION AS NEEDED
Status: DISCONTINUED | OUTPATIENT
Start: 2023-11-28 | End: 2023-11-28 | Stop reason: HOSPADM

## 2023-11-28 RX ORDER — SODIUM CHLORIDE 0.9 % (FLUSH) 0.9 %
10 SYRINGE (ML) INJECTION EVERY 12 HOURS SCHEDULED
Status: DISCONTINUED | OUTPATIENT
Start: 2023-11-28 | End: 2023-11-28 | Stop reason: HOSPADM

## 2023-11-28 RX ORDER — ASPIRIN 81 MG/1
81 TABLET ORAL DAILY
Status: DISCONTINUED | OUTPATIENT
Start: 2023-11-29 | End: 2023-11-29 | Stop reason: HOSPADM

## 2023-11-28 RX ORDER — SODIUM CHLORIDE 9 MG/ML
40 INJECTION, SOLUTION INTRAVENOUS AS NEEDED
Status: DISCONTINUED | OUTPATIENT
Start: 2023-11-28 | End: 2023-11-28 | Stop reason: HOSPADM

## 2023-11-28 RX ORDER — LOSARTAN POTASSIUM 50 MG/1
100 TABLET ORAL DAILY
Status: DISCONTINUED | OUTPATIENT
Start: 2023-11-28 | End: 2023-11-29 | Stop reason: HOSPADM

## 2023-11-28 RX ORDER — ASPIRIN 81 MG/1
TABLET, CHEWABLE ORAL
Status: DISCONTINUED | OUTPATIENT
Start: 2023-11-28 | End: 2023-11-28 | Stop reason: HOSPADM

## 2023-11-28 RX ORDER — FENTANYL CITRATE 50 UG/ML
INJECTION, SOLUTION INTRAMUSCULAR; INTRAVENOUS
Status: DISCONTINUED | OUTPATIENT
Start: 2023-11-28 | End: 2023-11-28 | Stop reason: HOSPADM

## 2023-11-28 RX ORDER — BUPROPION HYDROCHLORIDE 150 MG/1
150 TABLET, EXTENDED RELEASE ORAL DAILY
Status: DISCONTINUED | OUTPATIENT
Start: 2023-11-28 | End: 2023-11-29 | Stop reason: HOSPADM

## 2023-11-28 RX ORDER — RANOLAZINE 500 MG/1
500 TABLET, EXTENDED RELEASE ORAL 2 TIMES DAILY
COMMUNITY
End: 2023-11-29 | Stop reason: SDUPTHER

## 2023-11-28 RX ORDER — ISOSORBIDE MONONITRATE 60 MG/1
60 TABLET, EXTENDED RELEASE ORAL
Status: DISCONTINUED | OUTPATIENT
Start: 2023-11-28 | End: 2023-11-29 | Stop reason: HOSPADM

## 2023-11-28 RX ADMIN — TICAGRELOR 90 MG: 90 TABLET ORAL at 22:46

## 2023-11-28 RX ADMIN — ISOSORBIDE MONONITRATE 60 MG: 60 TABLET, EXTENDED RELEASE ORAL at 14:28

## 2023-11-28 RX ADMIN — LOSARTAN POTASSIUM 100 MG: 50 TABLET, FILM COATED ORAL at 14:28

## 2023-11-28 RX ADMIN — ATORVASTATIN CALCIUM 40 MG: 40 TABLET, FILM COATED ORAL at 14:28

## 2023-11-28 RX ADMIN — AMLODIPINE BESYLATE 5 MG: 5 TABLET ORAL at 14:28

## 2023-11-28 RX ADMIN — CARVEDILOL 3.12 MG: 3.12 TABLET, FILM COATED ORAL at 20:56

## 2023-11-28 RX ADMIN — BUPROPION HYDROCHLORIDE 150 MG: 150 TABLET, EXTENDED RELEASE ORAL at 14:28

## 2023-11-28 NOTE — PLAN OF CARE
Goal Outcome Evaluation:  Plan of Care Reviewed With: patient        Progress: no change  Outcome Evaluation: Patient admitted from cath lab, vss, a&ox4 on room air. TR band removed w/o complication, 13 ml total removed, no complaints at this time. patient now up ad matteo/

## 2023-11-28 NOTE — LETTER
November 29, 2023     Patient: Deo Pacheco   YOB: 1985   Date of Visit: 11/17/2023       To Whom It May Concern:    Patient admitted on 11/28/2023. Patient to remain off work for rest of the week; activity as tolerated. Do not lift more than 10 lbs. It is my medical opinion that Deo Pacheco may return to work on 12/04/2023 with regular activity.           Sincerely,    Daria Knight RN    No name on file    CC:   No Recipients

## 2023-11-29 ENCOUNTER — TELEPHONE (OUTPATIENT)
Dept: CARDIOLOGY | Facility: CLINIC | Age: 38
End: 2023-11-29
Payer: COMMERCIAL

## 2023-11-29 VITALS
HEART RATE: 82 BPM | TEMPERATURE: 99.3 F | OXYGEN SATURATION: 95 % | RESPIRATION RATE: 19 BRPM | DIASTOLIC BLOOD PRESSURE: 65 MMHG | SYSTOLIC BLOOD PRESSURE: 135 MMHG

## 2023-11-29 LAB
CHOLEST SERPL-MCNC: 198 MG/DL (ref 0–200)
DEPRECATED RDW RBC AUTO: 39.8 FL (ref 37–54)
ERYTHROCYTE [DISTWIDTH] IN BLOOD BY AUTOMATED COUNT: 12.5 % (ref 12.3–15.4)
HBA1C MFR BLD: 7.2 % (ref 4.8–5.6)
HCT VFR BLD AUTO: 38.2 % (ref 37.5–51)
HDLC SERPL-MCNC: 25 MG/DL (ref 40–60)
HGB BLD-MCNC: 12.9 G/DL (ref 13–17.7)
LDLC SERPL CALC-MCNC: 91 MG/DL (ref 0–100)
LDLC/HDLC SERPL: 2.98 {RATIO}
MCH RBC QN AUTO: 29.3 PG (ref 26.6–33)
MCHC RBC AUTO-ENTMCNC: 33.8 G/DL (ref 31.5–35.7)
MCV RBC AUTO: 86.8 FL (ref 79–97)
PLATELET # BLD AUTO: 256 10*3/MM3 (ref 140–450)
PMV BLD AUTO: 9.5 FL (ref 6–12)
RBC # BLD AUTO: 4.4 10*6/MM3 (ref 4.14–5.8)
TRIGL SERPL-MCNC: 492 MG/DL (ref 0–150)
VLDLC SERPL-MCNC: 82 MG/DL (ref 5–40)
WBC NRBC COR # BLD AUTO: 9.46 10*3/MM3 (ref 3.4–10.8)

## 2023-11-29 PROCEDURE — 80061 LIPID PANEL: CPT | Performed by: INTERNAL MEDICINE

## 2023-11-29 PROCEDURE — 83036 HEMOGLOBIN GLYCOSYLATED A1C: CPT | Performed by: INTERNAL MEDICINE

## 2023-11-29 PROCEDURE — 94799 UNLISTED PULMONARY SVC/PX: CPT

## 2023-11-29 PROCEDURE — 85027 COMPLETE CBC AUTOMATED: CPT | Performed by: INTERNAL MEDICINE

## 2023-11-29 PROCEDURE — 94761 N-INVAS EAR/PLS OXIMETRY MLT: CPT

## 2023-11-29 RX ORDER — ATORVASTATIN CALCIUM 40 MG/1
40 TABLET, FILM COATED ORAL DAILY
Qty: 90 TABLET | Refills: 3 | Status: SHIPPED | OUTPATIENT
Start: 2023-11-29

## 2023-11-29 RX ORDER — LOSARTAN POTASSIUM 100 MG/1
100 TABLET ORAL DAILY
Qty: 90 TABLET | Refills: 3 | Status: SHIPPED | OUTPATIENT
Start: 2023-11-29

## 2023-11-29 RX ORDER — RANOLAZINE 500 MG/1
500 TABLET, EXTENDED RELEASE ORAL 2 TIMES DAILY
Qty: 180 TABLET | Refills: 3 | Status: SHIPPED | OUTPATIENT
Start: 2023-11-29

## 2023-11-29 RX ORDER — AMLODIPINE BESYLATE 5 MG/1
5 TABLET ORAL DAILY
Qty: 90 TABLET | Refills: 3 | Status: SHIPPED | OUTPATIENT
Start: 2023-11-29

## 2023-11-29 RX ORDER — NITROGLYCERIN 0.4 MG/1
0.4 TABLET SUBLINGUAL
Qty: 25 TABLET | Refills: 12 | Status: SHIPPED | OUTPATIENT
Start: 2023-11-29

## 2023-11-29 RX ORDER — ASPIRIN 81 MG/1
81 TABLET ORAL DAILY
Qty: 90 TABLET | Refills: 33 | Status: SHIPPED | OUTPATIENT
Start: 2023-11-29

## 2023-11-29 RX ADMIN — ASPIRIN 81 MG: 81 TABLET, COATED ORAL at 08:26

## 2023-11-29 RX ADMIN — CARVEDILOL 3.12 MG: 3.12 TABLET, FILM COATED ORAL at 08:24

## 2023-11-29 RX ADMIN — LOSARTAN POTASSIUM 100 MG: 50 TABLET, FILM COATED ORAL at 08:25

## 2023-11-29 RX ADMIN — AMLODIPINE BESYLATE 5 MG: 5 TABLET ORAL at 08:24

## 2023-11-29 RX ADMIN — BUPROPION HYDROCHLORIDE 150 MG: 150 TABLET, EXTENDED RELEASE ORAL at 08:24

## 2023-11-29 RX ADMIN — TICAGRELOR 90 MG: 90 TABLET ORAL at 08:25

## 2023-11-29 RX ADMIN — ISOSORBIDE MONONITRATE 60 MG: 60 TABLET, EXTENDED RELEASE ORAL at 08:24

## 2023-11-29 RX ADMIN — ATORVASTATIN CALCIUM 40 MG: 40 TABLET, FILM COATED ORAL at 08:24

## 2023-11-29 NOTE — PLAN OF CARE
Problem: Adult Inpatient Plan of Care  Goal: Plan of Care Review  11/29/2023 0944 by Daria Knight RN  Outcome: Met  Flowsheets (Taken 11/29/2023 0944)  Plan of Care Reviewed With: patient  Outcome Evaluation: Patient alert and oriented throughout shift. Patient on room air throughout shift with no signs of distress. Patient plan of care discussed at bedside. Patient to be discharged today. Will continue with patient plan of care until discharged.  11/29/2023 0944 by Daria Knight RN  Outcome: Ongoing, Progressing  Flowsheets (Taken 11/29/2023 0944)  Plan of Care Reviewed With: patient  Outcome Evaluation: Patient alert and oriented throughout shift. Patient on room air throughout shift with no signs of distress. Patient plan of care discussed at bedside. Patient to be discharged today. Will continue with patient plan of care until discharged.   Goal Outcome Evaluation:  Plan of Care Reviewed With: patient           Outcome Evaluation: Patient alert and oriented throughout shift. Patient on room air throughout shift with no signs of distress. Patient plan of care discussed at bedside. Patient to be discharged today. Will continue with patient plan of care until discharged.

## 2023-12-04 ENCOUNTER — TELEPHONE (OUTPATIENT)
Dept: CARDIOLOGY | Facility: CLINIC | Age: 38
End: 2023-12-04
Payer: COMMERCIAL

## 2023-12-04 NOTE — TELEPHONE ENCOUNTER
We can give him a work note to put him off through 12/15.  If I need to sign, I can do that tomorrow when I am at [place] so if he just wants to wait until tomorrow to pick that up.

## 2023-12-04 NOTE — TELEPHONE ENCOUNTER
Incoming call from patient and he is wondering if there is any way you can give him a work note so he is off till 12/15/23 that's when his follow up is . Pt claims he is still not felling 100 percent well and said you told him if he needed extra time off you could help him .

## 2023-12-15 ENCOUNTER — OFFICE VISIT (OUTPATIENT)
Dept: CARDIOLOGY | Facility: CLINIC | Age: 38
End: 2023-12-15
Payer: COMMERCIAL

## 2023-12-15 VITALS
WEIGHT: 263 LBS | BODY MASS INDEX: 41.28 KG/M2 | SYSTOLIC BLOOD PRESSURE: 160 MMHG | HEIGHT: 67 IN | HEART RATE: 85 BPM | DIASTOLIC BLOOD PRESSURE: 104 MMHG

## 2023-12-15 DIAGNOSIS — I10 HYPERTENSION, ESSENTIAL: ICD-10-CM

## 2023-12-15 DIAGNOSIS — R06.09 DYSPNEA ON EXERTION: ICD-10-CM

## 2023-12-15 DIAGNOSIS — E78.2 HYPERLIPEMIA, MIXED: ICD-10-CM

## 2023-12-15 DIAGNOSIS — I25.10 CORONARY ARTERY DISEASE INVOLVING NATIVE CORONARY ARTERY OF NATIVE HEART WITHOUT ANGINA PECTORIS: Primary | ICD-10-CM

## 2023-12-15 PROCEDURE — 99214 OFFICE O/P EST MOD 30 MIN: CPT | Performed by: INTERNAL MEDICINE

## 2023-12-15 RX ORDER — PRASUGREL 10 MG/1
10 TABLET, FILM COATED ORAL DAILY
Qty: 90 TABLET | Refills: 3 | Status: SHIPPED | OUTPATIENT
Start: 2023-12-15

## 2023-12-15 RX ORDER — CARVEDILOL 6.25 MG/1
6.25 TABLET ORAL 2 TIMES DAILY
Qty: 180 TABLET | Refills: 3 | Status: SHIPPED | OUTPATIENT
Start: 2023-12-15

## 2023-12-15 NOTE — PROGRESS NOTES
Chief Complaint  Coronary Artery Disease, Hypertension, Hyperlipidemia, and STENT FOLLOW UP     Subjective            Deo Pacheco presents to [facility] CARDIOLOGY  Coronary Artery Disease  Symptoms include shortness of breath. Pertinent negatives include no chest pain. Risk factors include hyperlipidemia.   Hypertension  Associated symptoms include shortness of breath. Pertinent negatives include no chest pain.   Hyperlipidemia  Associated symptoms include shortness of breath. Pertinent negatives include no chest pain.       Mr. Pacheco is here for follow-up evaluation management of coronary artery disease, he had a non-ST elevation myocardial infarction in October and underwent PCI to a totally occluded marginal.  He had staged PCI of the left circumflex main body in November.  Since then he is doing relatively well.  He has noticed some dyspnea that seems to be a new complaint.  He denies chest pain.  He is still off work currently.    PMH  Past Medical History:   Diagnosis Date    Coronary artery disease     FOLLOWS W/CEDILLO, NO CP OR SOA    Epilepsy with status epilepticus 2015    NO ISSUES SINCE LAST SEIZURE YEARS AGO, NO MEDS    Hyperlipidemia     Hypertension     Kidney stone     NOCURRENT ISSUES    Myocardial infarction 2019    STENT [id number] 1ST 2019 AND 2 ND 10/24/23         SURGICALHX  Past Surgical History:   Procedure Laterality Date    CARDIAC CATHETERIZATION      CARDIAC CATHETERIZATION N/A 10/24/2023    Procedure: Left Heart Cath; STENTX1  Surgeon: Robinson Lay MD;  Location: [facility] CATH INVASIVE LOCATION;  Service: Cardiology;  Laterality: N/A;    CARDIAC CATHETERIZATION N/A 11/28/2023    Procedure: Left Heart Cath with possible coronary angioplasty;  Surgeon: Robinson Lay MD;  Location: [facility] CATH INVASIVE LOCATION;  Service: Cardiology;  Laterality: N/A;    CORONARY ANGIOPLASTY WITH STENT PLACEMENT Right 2019    KIDNEY STONE SURGERY      KNEE ARTHROSCOPY  W/ MENISCECTOMY Left 06/15/2023    Procedure: KNEE ARTHROSCOPY WITH PARTIAL LATERAL MENISCECTOMY;  Surgeon: Suraj Nair MD;  [place] [facility] [place] [facility] [place]  Service: Orthopedics;  Laterality: Left;        SOC  Social History     Socioeconomic History    Marital status: Significant Other   Tobacco Use    Smoking status: Former     Packs/day: 0.50     Years: 10.00     Additional pack years: 0.00     Total pack years: 5.00     Types: Cigarettes     Start date: 2007     Quit date: 10/23/2023     Years since quittin.1    Smokeless tobacco: Never   Vaping Use    Vaping Use: Never used   Substance and Sexual Activity    Alcohol use: Never    Drug use: Never    Sexual activity: Defer         FAMHX  Family History   Problem Relation Age of Onset    Cancer Mother     Diabetes Father     Cancer Father     Malig Hyperthermia Neg Hx           ALLERGY  No Known Allergies     MEDSCURRENT    Current Outpatient Medications:     amLODIPine (NORVASC) 5 MG tablet, Take 1 tablet by mouth Daily., Disp: 90 tablet, Rfl: 3    aspirin 81 MG EC tablet, Take 1 tablet by mouth Daily., Disp: 90 tablet, Rfl: 33    atorvastatin (LIPITOR) 40 MG tablet, Take 1 tablet by mouth Daily., Disp: 90 tablet, Rfl: 3    buPROPion SR (WELLBUTRIN SR) 150 MG 12 hr tablet, Take 1 tablet by mouth Daily., Disp: , Rfl:     carvedilol (COREG) 6.25 MG tablet, Take 1 tablet by mouth 2 (Two) Times a Day., Disp: 180 tablet, Rfl: 3    diclofenac (VOLTAREN) 75 MG EC tablet, Take 1 tablet by mouth 2 (Two) Times a Day., Disp: , Rfl:     isosorbide mononitrate (IMDUR) 60 MG 24 hr tablet, Take 1 tablet by mouth Daily., Disp: 90 tablet, Rfl: 3    losartan (COZAAR) 100 MG tablet, Take 1 tablet by mouth Daily., Disp: 90 tablet, Rfl: 3    metFORMIN (GLUCOPHAGE) 500 MG tablet, Take 1 tablet by mouth 2 (Two) Times a Day With Meals. (Patient taking differently: Take 1 tablet by mouth 2 (Two) Times a Day With Meals. LAST DOSE METFORMIN 23), Disp: 60 tablet, Rfl: 0     "nitroglycerin (Nitrostat) 0.4 MG SL tablet, Place 1 tablet under the tongue Every 5 (Five) Minutes As Needed for Chest Pain. Take no more than 3 doses in 15 minutes., Disp: 25 tablet, Rfl: 12    ranolazine (RANEXA) 500 MG 12 hr tablet, Take 1 tablet by mouth 2 (Two) Times a Day., Disp: 180 tablet, Rfl: 3    prasugrel (EFFIENT) 10 MG tablet, Take 1 tablet by mouth Daily., Disp: 90 tablet, Rfl: 3      Review of Systems   Cardiovascular:  Positive for dyspnea on exertion. Negative for chest pain.   Respiratory:  Positive for shortness of breath.         Objective     BP (!) 160/104   Pulse 85   Ht 170.2 cm (67\")   Wt 119 kg (263 lb)   BMI 41.19 kg/m²       General Appearance:   well developed  well nourished  HENT:   oropharynx moist  lips not cyanotic  Neck:  thyroid not enlarged  supple  Respiratory:  no respiratory distress  normal breath sounds  no rales  Cardiovascular:  no jugular venous distention  regular rhythm  apical impulse normal  S1 normal, S2 normal  no S3, no S4   no murmur  no rub, no thrill  carotid pulses normal; no bruit  pedal pulses normal  lower extremity edema: none    Musculoskeletal:  no clubbing of fingers.   normocephalic, head atraumatic  Skin:   warm, dry  Psychiatric:  judgement and insight appropriate  normal mood and affect      Result Review :     The following data was reviewed by: Quique Bullock MD on 12/15/2023:    CMP          10/24/2023    04:45 10/25/2023    04:24 11/28/2023    10:21   CMP   Glucose 204  143  148    BUN 15  8  11    Creatinine 0.77  0.87  0.84    EGFR 119.7  115.4  115.9    Sodium 137  137  138    Potassium 3.5  3.6  3.7    Chloride 104  104  103    Calcium 8.1  8.1  8.5    BUN/Creatinine Ratio 19.5  9.2  13.1    Anion Gap 11.5  9.7  11.0      CBC          10/25/2023    04:24 11/28/2023    10:21 11/29/2023    05:16   CBC   WBC 11.60  8.55  9.46    RBC 4.46  4.80  4.40    Hemoglobin 13.2  14.1  12.9    Hematocrit 39.1  41.3  38.2    MCV 87.7  86.0 "  86.8    MCH 29.6  29.4  29.3    MCHC 33.8  34.1  33.8    RDW 13.2  12.7  12.5    Platelets 233  275  256      Lipid Panel          10/24/2023    04:45 10/25/2023    04:24 11/29/2023    05:16   Lipid Panel   Total Cholesterol 143  132  198    Triglycerides 200  81  492    HDL Cholesterol 36  41  25    VLDL Cholesterol 34  16  82    LDL Cholesterol  73  75  91    LDL/HDL Ratio 1.86  1.82  2.98          Data reviewed : Cardiac catheterization reviewed      Procedures                Assessment and Plan        ASSESSMENT:  Encounter Diagnoses   Name Primary?    Coronary artery disease involving native coronary artery of native heart without angina pectoris Yes    Hypertension, essential     Hyperlipemia, mixed     Dyspnea on exertion          PLAN:    1.  Coronary artery disease, native vessel, status post staged PCI of the first marginal and then the circumflex.  He had distal apical LAD disease that was too diffuse to address so he has managed medically otherwise.  He is stable but is having dyspnea on exertion.  This is probably a side effect of the Brilinta.  I am changing him over to Effient 10 mg daily and continue aspirin.  2.  Essential hypertension-not controlled.  Increase carvedilol  3.  Mixed hyperlipidemia-stable, continue statin therapy    Follow-up in about 7 weeks for a blood pressure check.  He is agreeable with this plan          Patient was given instructions and counseling regarding his condition or for health maintenance advice. Please see specific information pulled into the AVS if appropriate.             TABITHA Bullock MD  12/15/2023    10:22 EST

## 2025-02-19 ENCOUNTER — TELEPHONE (OUTPATIENT)
Facility: HOSPITAL | Age: 40
End: 2025-02-19
Payer: COMMERCIAL

## 2025-02-19 NOTE — TELEPHONE ENCOUNTER
Patient called needs to schedule a follow up appointment with Dr. Bullock. Had to cancel last appointment's due to the loss of insurance. Has new insurance through [employer]. Patient stated that he has been without his medication  for a little while.

## (undated) DEVICE — BNDG ELAS MATRX V/CLS 6INX10YD LF

## (undated) DEVICE — GW FC FLOP/TP .035 260CM 3MM

## (undated) DEVICE — KNEE ARTHROSCOPY-LF: Brand: MEDLINE INDUSTRIES, INC.

## (undated) DEVICE — GLV SURG ULTRAFREE MAX LTX PF 8

## (undated) DEVICE — GLIDESHEATH SLENDER STAINLESS STEEL KIT: Brand: GLIDESHEATH SLENDER

## (undated) DEVICE — Device: Brand: ASAHI SION BLUE

## (undated) DEVICE — BLD CUT FORMLA AGGR PLS 4.0MM

## (undated) DEVICE — NC TREK NEO™ CORONARY DILATATION CATHETER 1.50 MM X 12 MM / RAPID-EXCHANGE: Brand: NC TREK NEO™

## (undated) DEVICE — GLV SURG SENSICARE PI PF LF 7 GRN STRL

## (undated) DEVICE — GUIDELINER CATHETERS ARE INTENDED TO BE USED IN CONJUNCTION WITH GUIDE CATHETERS TO ACCESS DISCRETE REGIONS OF THE CORONARY AND/OR PERIPHERAL VASCULATURE, AND TO FACILITATE PLACEMENT OF INTERVENTIONAL DEVICES.: Brand: GUIDELINER® V3 CATHETER

## (undated) DEVICE — STERILE POLYISOPRENE POWDER-FREE SURGICAL GLOVES: Brand: PROTEXIS

## (undated) DEVICE — DRSNG PAD ABD 8X10IN STRL

## (undated) DEVICE — CATH GUIDE LAUNCHER EBU3.5 6F 100CM

## (undated) DEVICE — RADIFOCUS OPTITORQUE ANGIOGRAPHIC CATHETER: Brand: OPTITORQUE

## (undated) DEVICE — FMS FLUID MANAGEMENT SYSTEM OUTFLOW TUBING WITHOUT ONE-WAY VALVE (FMS VUE OR FMS DUO PLUS): Brand: FMS

## (undated) DEVICE — APPL CHLORAPREP HI/LITE 26ML ORNG

## (undated) DEVICE — SOL IRR NACL 0.9PCT 3000ML

## (undated) DEVICE — NC TREK NEO™ CORONARY DILATATION CATHETER 2.50 X 12 MM / RAPID-EXCHANGE: Brand: NC TREK NEO™

## (undated) DEVICE — GOWN,REINFORCE,POLY,SIRUS,BREATH SLV,XLG: Brand: MEDLINE

## (undated) DEVICE — SUT ETHLN 3-0 FS118IN 663H

## (undated) DEVICE — DRSNG GZ PETROLTM XEROFORM CURAD 1X8IN STRL

## (undated) DEVICE — BALN PTCA TAKERURX DIL 2.5X15MM

## (undated) DEVICE — FMS FLUID MANAGEMENT SYSTEM INFLOW TUBING (FMS VUE): Brand: FMS

## (undated) DEVICE — HI-TORQUE PILOT 50 GUIDE WIRE .014 J TIP 3.0 CM X 190 CM: Brand: HI-TORQUE PILOT

## (undated) DEVICE — GLV SURG SENSICARE SLT PF LF 7 STRL

## (undated) DEVICE — DISPOSABLE TOURNIQUET CUFF SINGLE BLADDER, SINGLE PORT AND QUICK CONNECT CONNECTOR: Brand: COLOR CUFF